# Patient Record
Sex: MALE | Race: OTHER | NOT HISPANIC OR LATINO
[De-identification: names, ages, dates, MRNs, and addresses within clinical notes are randomized per-mention and may not be internally consistent; named-entity substitution may affect disease eponyms.]

---

## 2017-07-28 ENCOUNTER — APPOINTMENT (OUTPATIENT)
Dept: INTERNAL MEDICINE | Facility: CLINIC | Age: 47
End: 2017-07-28

## 2017-08-10 ENCOUNTER — OUTPATIENT (OUTPATIENT)
Dept: OUTPATIENT SERVICES | Facility: HOSPITAL | Age: 47
LOS: 1 days | End: 2017-08-10
Payer: SELF-PAY

## 2017-08-10 ENCOUNTER — APPOINTMENT (OUTPATIENT)
Dept: INTERNAL MEDICINE | Facility: CLINIC | Age: 47
End: 2017-08-10

## 2017-08-10 VITALS
HEIGHT: 67 IN | OXYGEN SATURATION: 96 % | DIASTOLIC BLOOD PRESSURE: 70 MMHG | BODY MASS INDEX: 31.71 KG/M2 | WEIGHT: 202 LBS | HEART RATE: 87 BPM | SYSTOLIC BLOOD PRESSURE: 119 MMHG

## 2017-08-10 DIAGNOSIS — I10 ESSENTIAL (PRIMARY) HYPERTENSION: ICD-10-CM

## 2017-08-10 DIAGNOSIS — R10.32 LEFT LOWER QUADRANT PAIN: ICD-10-CM

## 2017-08-10 DIAGNOSIS — E78.5 HYPERLIPIDEMIA, UNSPECIFIED: ICD-10-CM

## 2017-08-10 DIAGNOSIS — Z00.00 ENCOUNTER FOR GENERAL ADULT MEDICAL EXAMINATION W/OUT ABNORMAL FINDINGS: ICD-10-CM

## 2017-08-10 PROCEDURE — G0463: CPT

## 2017-08-10 PROCEDURE — 90715 TDAP VACCINE 7 YRS/> IM: CPT

## 2017-08-10 PROCEDURE — 90471 IMMUNIZATION ADMIN: CPT

## 2017-08-14 DIAGNOSIS — E78.5 HYPERLIPIDEMIA, UNSPECIFIED: ICD-10-CM

## 2017-08-14 DIAGNOSIS — R10.32 LEFT LOWER QUADRANT PAIN: ICD-10-CM

## 2018-04-21 ENCOUNTER — INPATIENT (INPATIENT)
Facility: HOSPITAL | Age: 48
LOS: 4 days | Discharge: HOME | DRG: 471 | End: 2018-04-26
Attending: NEUROLOGICAL SURGERY | Admitting: PSYCHIATRY & NEUROLOGY
Payer: MEDICAID

## 2018-04-21 VITALS
WEIGHT: 199.96 LBS | TEMPERATURE: 98 F | DIASTOLIC BLOOD PRESSURE: 96 MMHG | HEART RATE: 102 BPM | HEIGHT: 71 IN | RESPIRATION RATE: 17 BRPM | OXYGEN SATURATION: 99 % | SYSTOLIC BLOOD PRESSURE: 164 MMHG

## 2018-04-21 DIAGNOSIS — R27.0 ATAXIA, UNSPECIFIED: ICD-10-CM

## 2018-04-21 LAB
ALBUMIN SERPL ELPH-MCNC: 4.4 G/DL — SIGNIFICANT CHANGE UP (ref 3.3–5)
ALP SERPL-CCNC: 71 U/L — SIGNIFICANT CHANGE UP (ref 40–120)
ALT FLD-CCNC: 28 U/L — SIGNIFICANT CHANGE UP (ref 10–45)
ANION GAP SERPL CALC-SCNC: 12 MMOL/L — SIGNIFICANT CHANGE UP (ref 5–17)
APPEARANCE UR: CLEAR — SIGNIFICANT CHANGE UP
APTT BLD: 28 SEC — SIGNIFICANT CHANGE UP (ref 27.5–37.4)
AST SERPL-CCNC: 23 U/L — SIGNIFICANT CHANGE UP (ref 10–40)
BACTERIA # UR AUTO: ABNORMAL /HPF
BASOPHILS # BLD AUTO: 0.1 K/UL — SIGNIFICANT CHANGE UP (ref 0–0.2)
BASOPHILS NFR BLD AUTO: 1.5 % — SIGNIFICANT CHANGE UP (ref 0–2)
BILIRUB SERPL-MCNC: 0.2 MG/DL — SIGNIFICANT CHANGE UP (ref 0.2–1.2)
BILIRUB UR-MCNC: NEGATIVE — SIGNIFICANT CHANGE UP
BUN SERPL-MCNC: 19 MG/DL — SIGNIFICANT CHANGE UP (ref 7–23)
CALCIUM SERPL-MCNC: 9.5 MG/DL — SIGNIFICANT CHANGE UP (ref 8.4–10.5)
CHLORIDE SERPL-SCNC: 102 MMOL/L — SIGNIFICANT CHANGE UP (ref 96–108)
CO2 SERPL-SCNC: 25 MMOL/L — SIGNIFICANT CHANGE UP (ref 22–31)
COLOR SPEC: SIGNIFICANT CHANGE UP
CREAT SERPL-MCNC: 1.13 MG/DL — SIGNIFICANT CHANGE UP (ref 0.5–1.3)
DIFF PNL FLD: NEGATIVE — SIGNIFICANT CHANGE UP
EOSINOPHIL # BLD AUTO: 0.3 K/UL — SIGNIFICANT CHANGE UP (ref 0–0.5)
EOSINOPHIL NFR BLD AUTO: 2.9 % — SIGNIFICANT CHANGE UP (ref 0–6)
GLUCOSE SERPL-MCNC: 107 MG/DL — HIGH (ref 70–99)
GLUCOSE UR QL: NEGATIVE — SIGNIFICANT CHANGE UP
HCT VFR BLD CALC: 45.1 % — SIGNIFICANT CHANGE UP (ref 39–50)
HGB BLD-MCNC: 14.8 G/DL — SIGNIFICANT CHANGE UP (ref 13–17)
HIV 1 & 2 AB SERPL IA.RAPID: SIGNIFICANT CHANGE UP
INR BLD: 0.99 RATIO — SIGNIFICANT CHANGE UP (ref 0.88–1.16)
KETONES UR-MCNC: NEGATIVE — SIGNIFICANT CHANGE UP
LEUKOCYTE ESTERASE UR-ACNC: NEGATIVE — SIGNIFICANT CHANGE UP
LYMPHOCYTES # BLD AUTO: 2.7 K/UL — SIGNIFICANT CHANGE UP (ref 1–3.3)
LYMPHOCYTES # BLD AUTO: 31.2 % — SIGNIFICANT CHANGE UP (ref 13–44)
MCHC RBC-ENTMCNC: 26 PG — LOW (ref 27–34)
MCHC RBC-ENTMCNC: 32.8 GM/DL — SIGNIFICANT CHANGE UP (ref 32–36)
MCV RBC AUTO: 79.1 FL — LOW (ref 80–100)
MONOCYTES # BLD AUTO: 0.7 K/UL — SIGNIFICANT CHANGE UP (ref 0–0.9)
MONOCYTES NFR BLD AUTO: 8.5 % — SIGNIFICANT CHANGE UP (ref 2–14)
NEUTROPHILS # BLD AUTO: 4.9 K/UL — SIGNIFICANT CHANGE UP (ref 1.8–7.4)
NEUTROPHILS NFR BLD AUTO: 56 % — SIGNIFICANT CHANGE UP (ref 43–77)
NITRITE UR-MCNC: NEGATIVE — SIGNIFICANT CHANGE UP
PH UR: 6 — SIGNIFICANT CHANGE UP (ref 5–8)
PLATELET # BLD AUTO: 318 K/UL — SIGNIFICANT CHANGE UP (ref 150–400)
POTASSIUM SERPL-MCNC: 4 MMOL/L — SIGNIFICANT CHANGE UP (ref 3.5–5.3)
POTASSIUM SERPL-SCNC: 4 MMOL/L — SIGNIFICANT CHANGE UP (ref 3.5–5.3)
PROT SERPL-MCNC: 7.7 G/DL — SIGNIFICANT CHANGE UP (ref 6–8.3)
PROT UR-MCNC: NEGATIVE — SIGNIFICANT CHANGE UP
PROTHROM AB SERPL-ACNC: 10.7 SEC — SIGNIFICANT CHANGE UP (ref 9.8–12.7)
RBC # BLD: 5.7 M/UL — SIGNIFICANT CHANGE UP (ref 4.2–5.8)
RBC # FLD: 12.2 % — SIGNIFICANT CHANGE UP (ref 10.3–14.5)
RBC CASTS # UR COMP ASSIST: SIGNIFICANT CHANGE UP /HPF (ref 0–2)
SODIUM SERPL-SCNC: 139 MMOL/L — SIGNIFICANT CHANGE UP (ref 135–145)
SP GR SPEC: 1.01 — SIGNIFICANT CHANGE UP (ref 1.01–1.02)
UROBILINOGEN FLD QL: NEGATIVE — SIGNIFICANT CHANGE UP
WBC # BLD: 8.7 K/UL — SIGNIFICANT CHANGE UP (ref 3.8–10.5)
WBC # FLD AUTO: 8.7 K/UL — SIGNIFICANT CHANGE UP (ref 3.8–10.5)
WBC UR QL: SIGNIFICANT CHANGE UP /HPF (ref 0–5)

## 2018-04-21 PROCEDURE — 70450 CT HEAD/BRAIN W/O DYE: CPT | Mod: 26

## 2018-04-21 PROCEDURE — 72158 MRI LUMBAR SPINE W/O & W/DYE: CPT | Mod: 26

## 2018-04-21 PROCEDURE — 99285 EMERGENCY DEPT VISIT HI MDM: CPT

## 2018-04-21 PROCEDURE — 70553 MRI BRAIN STEM W/O & W/DYE: CPT | Mod: 26

## 2018-04-21 PROCEDURE — 72157 MRI CHEST SPINE W/O & W/DYE: CPT | Mod: 26

## 2018-04-21 PROCEDURE — 71046 X-RAY EXAM CHEST 2 VIEWS: CPT | Mod: 26

## 2018-04-21 RX ORDER — ACETAMINOPHEN 500 MG
905 TABLET ORAL EVERY 4 HOURS
Qty: 0 | Refills: 0 | Status: DISCONTINUED | OUTPATIENT
Start: 2018-04-21 | End: 2018-04-25

## 2018-04-21 RX ORDER — ENOXAPARIN SODIUM 100 MG/ML
40 INJECTION SUBCUTANEOUS EVERY 24 HOURS
Qty: 0 | Refills: 0 | Status: DISCONTINUED | OUTPATIENT
Start: 2018-04-21 | End: 2018-04-22

## 2018-04-21 RX ORDER — SODIUM CHLORIDE 9 MG/ML
1000 INJECTION INTRAMUSCULAR; INTRAVENOUS; SUBCUTANEOUS ONCE
Qty: 0 | Refills: 0 | Status: COMPLETED | OUTPATIENT
Start: 2018-04-21 | End: 2018-04-21

## 2018-04-21 RX ORDER — DOCUSATE SODIUM 100 MG
100 CAPSULE ORAL THREE TIMES A DAY
Qty: 0 | Refills: 0 | Status: DISCONTINUED | OUTPATIENT
Start: 2018-04-21 | End: 2018-04-25

## 2018-04-21 RX ORDER — ACETAMINOPHEN 500 MG
905 TABLET ORAL EVERY 6 HOURS
Qty: 0 | Refills: 0 | Status: DISCONTINUED | OUTPATIENT
Start: 2018-04-21 | End: 2018-04-25

## 2018-04-21 RX ADMIN — SODIUM CHLORIDE 1000 MILLILITER(S): 9 INJECTION INTRAMUSCULAR; INTRAVENOUS; SUBCUTANEOUS at 15:00

## 2018-04-21 NOTE — ED ADULT NURSE REASSESSMENT NOTE - NS ED NURSE REASSESS COMMENT FT1
Report received from MARTIN HAQUE in [employer].  Patient is admitted and pending bed assignment upstairs.

## 2018-04-21 NOTE — ED PROVIDER NOTE - MEDICAL DECISION MAKING DETAILS
Nemes - 46yo M w no PMHx in the ER for unsteady gait and back pains. Intermittent neck/ back stiffness/ and pain for the past 2weeks mainly mid/upper thoracic area, radiating around both sides of the chest. Unsteady gait developed gradually in the past few days. 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 No other neuro stx, no incontinence, no saddle anesthesia. Neuro intact on exam, no motor/sensory deficits. Hyperactive reflexes to lower xtremoties, unsteady, wide-based gait. Will get CT head, labs, neuro consult, likely admit for further w/u and MRIs

## 2018-04-21 NOTE — H&P ADULT - CRANIAL NERVE
EMOI no nystagmus, no diplopia, PERRLA, V1-3 intact b/l, facial symmetry b/l, hearing grossly intact, tongue midline, VFF, SCMs/traps intact b/l

## 2018-04-21 NOTE — ED ADULT NURSE NOTE - CHPI ED SYMPTOMS NEG
no confusion/no vomiting/no fever/no change in level of consciousness/no dizziness/no nausea/no loss of consciousness/no blurred vision

## 2018-04-21 NOTE — ED PROVIDER NOTE - PHYSICAL EXAMINATION
NAD, VSS (repeated HR- 86), Afebrile, No facial or scalp tender, No spinal tender on palpation, + Mild tender to right cervical trapezium tender without swelling or lesion, Sensory intact with 5/5 strength of all extremities, + Ataxia with unsteady gait.

## 2018-04-21 NOTE — H&P ADULT - ASSESSMENT
48 yo right-handed man who presents to [facility] w/ 2 day onset of gait ataxia (no heel to toe when walking). This was preceded by 2 week onset of neck and lower back stiffness x 2 weeks one day after carrying heavy objects up a stair case. Patient is a  by profession and has no pertinent PMH/PSH surgery. ROS also revealed intermittent tingling pain into the left axilla when neck flexion, otherwise unremarkable for headache, fever, chills, acute vision changes, trauma, LOC, chest pain, SOB, abdominal pain, N/V, loss of bowel/bladder, focal weakness, numbness.

## 2018-04-21 NOTE — H&P ADULT - NSHPLABSRESULTS_GEN_ALL_CORE
04-21    139  |  102  |  19  ----------------------------<  107<H>  4.0   |  25  |  1.13    Ca    9.5      21 Apr 2018 15:05    TPro  7.7  /  Alb  4.4  /  TBili  0.2  /  DBili  x   /  AST  23  /  ALT  28  /  AlkPhos  71  04-21    LIVER FUNCTIONS - ( 21 Apr 2018 15:05 )  Alb: 4.4 g/dL / Pro: 7.7 g/dL / ALK PHOS: 71 U/L / ALT: 28 U/L / AST: 23 U/L / GGT: x           CBC Full  -  ( 21 Apr 2018 15:05 )  WBC Count : 8.7 K/uL  Hemoglobin : 14.8 g/dL  Hematocrit : 45.1 %  Platelet Count - Automated : 318 K/uL  Mean Cell Volume : 79.1 fl  Mean Cell Hemoglobin : 26.0 pg  Mean Cell Hemoglobin Concentration : 32.8 gm/dL  Auto Neutrophil # : 4.9 K/uL  Auto Lymphocyte # : 2.7 K/uL  Auto Monocyte # : 0.7 K/uL  Auto Eosinophil # : 0.3 K/uL  Auto Basophil # : 0.1 K/uL  Auto Neutrophil % : 56.0 %  Auto Lymphocyte % : 31.2 %  Auto Monocyte % : 8.5 %  Auto Eosinophil % : 2.9 %  Auto Basophil % : 1.5 %      Radiology

## 2018-04-21 NOTE — H&P ADULT - NEUROLOGICAL COMMENTS
see HPI Brisk 3+ reflexes b/l achilles, b/l patellar. Normal 2+ reflexes abdominal/biceps/BR/triceps. Absent Babinski/Mcknight's. No dysmetria FTN/HTS. Fine finger movements intact. Brisk 3+ reflexes b/l achilles, b/l patellar. Normal 2+ reflexes abdominal/biceps/BR/triceps. Absent Babinski/Mcknight's. No dysmetria FTN/HTS. Fine finger movements intact. + sustained ankle jerk clonus (L > R)

## 2018-04-21 NOTE — H&P ADULT - PROBLEM SELECTOR PLAN 1
w/ b/l LE hyperreflexia (patellar/achilles; although absent Babinski), otherwise normal exam. Concern for possible lateral corticospinal tract lesion (possibly thoracic/lumbar region; upper UE neuro exam normal). Ddx: cord compression (trauma, tumor, etc.) vs. demyelinating (less likely).   Plan:   -MRI T/L spine w/ and w/o contrast (w/ spinal cord infarct parameters r/o (although unlikely))  -MRI brain w/ and w/o contrast   -Labs: basic labs (CBC w/ diff, CMP, U/a, CRP/ESR)   -PT/OT  -course of treatment pending imaging.

## 2018-04-21 NOTE — ED ADULT TRIAGE NOTE - CHIEF COMPLAINT QUOTE
back pain x 2 weeks but now worsening no trauma or injury and "feels like shocks when he lifts both arms."

## 2018-04-21 NOTE — H&P ADULT - HISTORY OF PRESENT ILLNESS
46 yo right-handed man who presents to [facility] w/ 2 day onset of gait ataxia (no heel to toe when walking). This was preceded by 2 week onset of neck and lower back stiffness x 2 weeks one day after carrying heavy objects up a stair case. Patient is a  by profession and has no pertinent PMH/PSH surgery. ROS also revealed intermittent tingling pain into the left axilla when neck flexion, otherwise unremarkable for headache, fever, chills, acute vision changes, trauma, LOC, chest pain, SOB, abdominal pain, N/V, loss of bowel/bladder, focal weakness, numbness.

## 2018-04-21 NOTE — ED PROVIDER NOTE - OBJECTIVE STATEMENT
46yo male pt, no PMHx c/o unsteady gait. Pt stated he had intermittent neck/ back stiffness/ pain since 2weeks ago and he noticed unsteady gait gradually. Denies injury. Pt described pain as stiffness of all spine with intermittent radiating shock pain to all extremities.  He also feels weakness of B/L LE with unsteady gait. Denies fever, chills or recent sickness. Denies headache, dizziness or visual changes. Denies CP/SOB/ABD pain or N/V/D. Denies sensory changes to extremities. Denies urinary or bowel problems.

## 2018-04-22 LAB — APTT BLD: 32.1 SEC — SIGNIFICANT CHANGE UP (ref 27.5–37.4)

## 2018-04-22 PROCEDURE — 72156 MRI NECK SPINE W/O & W/DYE: CPT | Mod: 26

## 2018-04-22 PROCEDURE — 72125 CT NECK SPINE W/O DYE: CPT | Mod: 26

## 2018-04-22 RX ORDER — DEXAMETHASONE 0.5 MG/5ML
10 ELIXIR ORAL EVERY 6 HOURS
Qty: 0 | Refills: 0 | Status: DISCONTINUED | OUTPATIENT
Start: 2018-04-22 | End: 2018-04-22

## 2018-04-22 RX ORDER — DEXAMETHASONE 0.5 MG/5ML
10 ELIXIR ORAL ONCE
Qty: 0 | Refills: 0 | Status: COMPLETED | OUTPATIENT
Start: 2018-04-22 | End: 2018-04-22

## 2018-04-22 RX ORDER — SODIUM CHLORIDE 9 MG/ML
1000 INJECTION, SOLUTION INTRAVENOUS
Qty: 0 | Refills: 0 | Status: DISCONTINUED | OUTPATIENT
Start: 2018-04-23 | End: 2018-04-25

## 2018-04-22 RX ORDER — DEXAMETHASONE 0.5 MG/5ML
10 ELIXIR ORAL EVERY 6 HOURS
Qty: 0 | Refills: 0 | Status: DISCONTINUED | OUTPATIENT
Start: 2018-04-22 | End: 2018-04-25

## 2018-04-22 RX ADMIN — Medication 102 MILLIGRAM(S): at 12:52

## 2018-04-22 RX ADMIN — Medication 102 MILLIGRAM(S): at 23:31

## 2018-04-22 RX ADMIN — ENOXAPARIN SODIUM 40 MILLIGRAM(S): 100 INJECTION SUBCUTANEOUS at 05:47

## 2018-04-22 RX ADMIN — Medication 102 MILLIGRAM(S): at 17:20

## 2018-04-22 NOTE — CONSULT NOTE ADULT - SUBJECTIVE AND OBJECTIVE BOX
The patient was seen and examined today. He complains of Left arm and leg weakness > than right arm and leg weakness, rapidly progressive over the past 2 weeks. MRI confirms C4-5 HNP with cord compression and edema requiring surgical decompression and stabilization. He has no medical comorbidities. Exam, lab and EKG are stable. The patient is medically stable, medically optimized and has no medical contraindication to surgery tomorrow as required. Exam time 70 minutes including > than 50 % for bedside discussion and counseling. Comprehensive consultation dictated #[id number]

## 2018-04-22 NOTE — CONSULT NOTE ADULT - SUBJECTIVE AND OBJECTIVE BOX
CONSULTING SERVICE:  Internal Medicine.    HISTORY OF PRESENT ILLNESS:  This is the first [facility] admission for this 47-year-old male who presents with a 2-week history of neck pain.  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  Of late, he notes that he had a rock fall on the back of his neck while at work.  He last worked 3 days ago, but has diminishing capacity.  The patient notes that approximately 4 days ago he started developing weakness in arms and legs.  He was having difficulty walking.  He was having difficulty holding objects in his left hand.  He has also developed numbness and tingling in his legs and left arm.  He has constant discomfort in his left arm around the clock and rapidly progressive weakness in his left arm.  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  The patient has been advised surgical decompression, stabilization procedure.  In addition on MRI, he has disease extending into C3-C4 and also below at C5-C6 but not of the extent of the C4-C5 level.    MEDICATIONS:  The patient's present medication includes dexamethasone 10 mg every 6 hours.  He also takes Tylenol for pain relief.    ALLERGIES:  HE HAS NO KNOWN ALLERGIES.    PAST SURGICAL HISTORY:  He has had no previous surgical history.    PAST MEDICAL HISTORY:  He has had no previous medical history.    FAMILY HISTORY:  Essentially normal.    DIET:  The patient's diet is regular and his weight is 200 pounds.        SOCIAL HISTORY:  He is a nonsmoker, nondrinker with no drug history.  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  He lives with his wife and has two older children.    REVIEW OF SYSTEMS:  He states that he hasmoderate headaches that originate from the cervical spine radiating up into the occiput.  He has no changes in hearing or vision.  He has no sinusitis or dental disease.  He has no difficulty swallowing.  He has no shortness of breath, cough, congestion or wheezing.  He has no chest pain, palpitations or arrhythmias.  He has no abdominal pain, change in bowel habits or GI bleeding.  He has no dysuria, frequency or incontinence.  He has no rashes or skin disease.  He has no diabetes or thyroid disease.  He has had no prior neurological complaints.    PHYSICAL EXAMINATION:  At this time shows, VITAL SIGNS:  Blood pressure of 130/90, pulse is 100, respirations are 19.  He is afebrile.  Head:  Examination is normal.  Neck:  He has decreased range of motion with rigidity and tenderness in the mid cervical spine.  CHEST:  He has good breath sounds bilaterally.  CARDIAC:  Examination without gallops or murmurs.  ABDOMEN:  Soft without masses, tenderness, guarding or rebound.  EXTREMITIES:  Without clubbing, cyanosis or edema.  NEUROLOGICAL:  Examination shows 3/5 strength in the left arm, 4/5 strength in the right arm, 2/5 strength in the left leg and 4/5 strength in the right leg.  He has moderate to significant weakness as described.  Sensation is intact to touch and position.    LABORATORY DATA:  Laboratories obtained shows a hemoglobin of 14.8, hematocrit of 45.1, white count of 8.7, platelet count of 318,000.  INR is 0.99.  PTT is 28.0.  Sodium 139, potassium 4.0, chloride is 102, CO2 is 25, BUN is 19, creatinine is 1.13.  Liver enzymes are normal.  HIV was nonreactive.    RADIOLOGY DATA:  CT of the brain was within normal limits.  CT and MRI of spine as prescribed, has a significant lesion at the C4-C5 level with central disk herniation and extruded disk material extending superiorly beyond the C4 vertebral body.  There is compression of the spinal cord with edema, secondary to cord compression.  There is degenerative disease of the levels above and below this at C3-C4, C4-C5, and C5-C6.  The patient's EKG is within normal limits.    IMPRESSION AND PLAN:  The impression is that the patient's is medically stable and has no medical contraindications to cervical decompression of the herniated disk and spinal cord compression.  The examination time was 70 minutes of which greater than 50% was necessary for bedside discussion and counseling with the patient.  He will continue to have medical management postoperatively to lessen the risk of postoperative complications.        _______________________    DICT:	ADRIANA TYSON MD  ([id number]) 04/22/2018 07:56 PM  TRANS:	[id number] 04/22/2018 08:03 PM  JOB:	[id number]    Electronically Signed by:  ADRIANA TYSON 04/23/2018 06:57:56 PM

## 2018-04-22 NOTE — CHART NOTE - NSCHARTNOTEFT_GEN_A_CORE
MRI c-spine concerning for cord compression  Decadron 10 given  Ortho spine consulted, will se the patient

## 2018-04-22 NOTE — CONSULT NOTE ADULT - SUBJECTIVE AND OBJECTIVE BOX
47 year old male presented to [facility] with neck/lower back pain and difficulty ambulating for 4 days. 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 He attempted to rest, but the pain has continued over the last two weeks. About 4 days ago, patient began to notice difficulty walking and unsteadiness with his gait. He then presented to [facility] for evaluation. MRI showed cervical cord compression secondary to disc extrusion. No bowel/bladder symptoms. No fevers/chills.    PAST MEDICAL & SURGICAL HISTORY:  No pertinent past medical history  No significant past surgical history    Vital Signs Last 24 Hrs  T(C): 37.1 (22 Apr 2018 15:55), Max: 37.1 (22 Apr 2018 15:55)  T(F): 98.7 (22 Apr 2018 15:55), Max: 98.7 (22 Apr 2018 15:55)  HR: 101 (22 Apr 2018 15:55) (68 - 101)  BP: 133/92 (22 Apr 2018 15:55) (115/72 - 159/87)  BP(mean): --  RR: 19 (22 Apr 2018 15:55) (16 - 20)  SpO2: 97% (22 Apr 2018 15:55) (96% - 99%)  MRI:   Multilevel degenerative changes with a disc extrusion at C4-5 contributing   to compression of the spinal cord. Elevated signal intensity within the   spinal cord at this level is consistent with edema and/or gliosis from cord   compression.   Exam:  Gen: NAD, Positive Mcknight sign bilaterally, decreased coordination  Motor: 5/5 AIN/PIN/Median/Radial/Ulnar nerve distributions, 5/5 EHL/FHL/TA/GSC  Sensory: SILT Median/Radial/Ulnar Nerve Distributions  Vascular: 2+ Radial pulse    A/P: 47 year old male with C4-5 cord compression  - Pain control  - OR planning for 4/23/18  - NPO past midnight  - Preoperative labs  - Medicine Clearance

## 2018-04-23 DIAGNOSIS — G95.20 UNSPECIFIED CORD COMPRESSION: ICD-10-CM

## 2018-04-23 DIAGNOSIS — R52 PAIN, UNSPECIFIED: ICD-10-CM

## 2018-04-23 LAB
ANION GAP SERPL CALC-SCNC: 14 MMOL/L — SIGNIFICANT CHANGE UP (ref 5–17)
BLD GP AB SCN SERPL QL: NEGATIVE — SIGNIFICANT CHANGE UP
BUN SERPL-MCNC: 16 MG/DL — SIGNIFICANT CHANGE UP (ref 7–23)
CALCIUM SERPL-MCNC: 9.9 MG/DL — SIGNIFICANT CHANGE UP (ref 8.4–10.5)
CHLORIDE SERPL-SCNC: 100 MMOL/L — SIGNIFICANT CHANGE UP (ref 96–108)
CHOLEST SERPL-MCNC: 244 MG/DL — HIGH (ref 10–199)
CO2 SERPL-SCNC: 24 MMOL/L — SIGNIFICANT CHANGE UP (ref 22–31)
CREAT SERPL-MCNC: 0.99 MG/DL — SIGNIFICANT CHANGE UP (ref 0.5–1.3)
GLUCOSE BLDC GLUCOMTR-MCNC: 140 MG/DL — HIGH (ref 70–99)
GLUCOSE BLDC GLUCOMTR-MCNC: 198 MG/DL — HIGH (ref 70–99)
GLUCOSE SERPL-MCNC: 161 MG/DL — HIGH (ref 70–99)
HBA1C BLD-MCNC: 6 % — HIGH (ref 4–5.6)
HCT VFR BLD CALC: 46.3 % — SIGNIFICANT CHANGE UP (ref 39–50)
HDLC SERPL-MCNC: 73 MG/DL — SIGNIFICANT CHANGE UP (ref 40–125)
HGB BLD-MCNC: 15.5 G/DL — SIGNIFICANT CHANGE UP (ref 13–17)
INR BLD: 1.08 RATIO — SIGNIFICANT CHANGE UP (ref 0.88–1.16)
LIPID PNL WITH DIRECT LDL SERPL: 163 MG/DL — HIGH
MCHC RBC-ENTMCNC: 26.3 PG — LOW (ref 27–34)
MCHC RBC-ENTMCNC: 33.5 GM/DL — SIGNIFICANT CHANGE UP (ref 32–36)
MCV RBC AUTO: 78.4 FL — LOW (ref 80–100)
PLATELET # BLD AUTO: 336 K/UL — SIGNIFICANT CHANGE UP (ref 150–400)
POTASSIUM SERPL-MCNC: 4.1 MMOL/L — SIGNIFICANT CHANGE UP (ref 3.5–5.3)
POTASSIUM SERPL-SCNC: 4.1 MMOL/L — SIGNIFICANT CHANGE UP (ref 3.5–5.3)
PROTHROM AB SERPL-ACNC: 11.8 SEC — SIGNIFICANT CHANGE UP (ref 9.8–12.7)
RBC # BLD: 5.9 M/UL — HIGH (ref 4.2–5.8)
RBC # FLD: 12.3 % — SIGNIFICANT CHANGE UP (ref 10.3–14.5)
RH IG SCN BLD-IMP: POSITIVE — SIGNIFICANT CHANGE UP
RH IG SCN BLD-IMP: POSITIVE — SIGNIFICANT CHANGE UP
SODIUM SERPL-SCNC: 138 MMOL/L — SIGNIFICANT CHANGE UP (ref 135–145)
TOTAL CHOLESTEROL/HDL RATIO MEASUREMENT: 3.3 RATIO — LOW (ref 3.4–9.6)
TRIGL SERPL-MCNC: 41 MG/DL — SIGNIFICANT CHANGE UP (ref 10–149)
TSH SERPL-MCNC: 0.91 UIU/ML — SIGNIFICANT CHANGE UP (ref 0.27–4.2)
VIT B12 SERPL-MCNC: 524 PG/ML — SIGNIFICANT CHANGE UP (ref 232–1245)
WBC # BLD: 12.8 K/UL — HIGH (ref 3.8–10.5)
WBC # FLD AUTO: 12.8 K/UL — HIGH (ref 3.8–10.5)

## 2018-04-23 PROCEDURE — 99232 SBSQ HOSP IP/OBS MODERATE 35: CPT

## 2018-04-23 RX ORDER — PANTOPRAZOLE SODIUM 20 MG/1
40 TABLET, DELAYED RELEASE ORAL
Qty: 0 | Refills: 0 | Status: DISCONTINUED | OUTPATIENT
Start: 2018-04-23 | End: 2018-04-25

## 2018-04-23 RX ORDER — INSULIN LISPRO 100/ML
VIAL (ML) SUBCUTANEOUS AT BEDTIME
Qty: 0 | Refills: 0 | Status: DISCONTINUED | OUTPATIENT
Start: 2018-04-23 | End: 2018-04-25

## 2018-04-23 RX ORDER — DEXTROSE 50 % IN WATER 50 %
25 SYRINGE (ML) INTRAVENOUS ONCE
Qty: 0 | Refills: 0 | Status: DISCONTINUED | OUTPATIENT
Start: 2018-04-23 | End: 2018-04-25

## 2018-04-23 RX ORDER — GLUCAGON INJECTION, SOLUTION 0.5 MG/.1ML
1 INJECTION, SOLUTION SUBCUTANEOUS ONCE
Qty: 0 | Refills: 0 | Status: DISCONTINUED | OUTPATIENT
Start: 2018-04-23 | End: 2018-04-25

## 2018-04-23 RX ADMIN — Medication 102 MILLIGRAM(S): at 12:00

## 2018-04-23 RX ADMIN — SODIUM CHLORIDE 100 MILLILITER(S): 9 INJECTION, SOLUTION INTRAVENOUS at 12:02

## 2018-04-23 RX ADMIN — SODIUM CHLORIDE 100 MILLILITER(S): 9 INJECTION, SOLUTION INTRAVENOUS at 00:01

## 2018-04-23 RX ADMIN — PANTOPRAZOLE SODIUM 40 MILLIGRAM(S): 20 TABLET, DELAYED RELEASE ORAL at 21:23

## 2018-04-23 RX ADMIN — SODIUM CHLORIDE 100 MILLILITER(S): 9 INJECTION, SOLUTION INTRAVENOUS at 23:48

## 2018-04-23 RX ADMIN — Medication 102 MILLIGRAM(S): at 17:47

## 2018-04-23 RX ADMIN — Medication 102 MILLIGRAM(S): at 23:48

## 2018-04-23 RX ADMIN — Medication 102 MILLIGRAM(S): at 05:36

## 2018-04-23 NOTE — PROGRESS NOTE ADULT - SUBJECTIVE AND OBJECTIVE BOX
Pt S/E at bedside, no acute events overnight, pain controlled    Vital Signs Last 24 Hrs  T(C): 36.7 (23 Apr 2018 04:31), Max: 37.3 (22 Apr 2018 20:04)  T(F): 98.1 (23 Apr 2018 04:31), Max: 99.1 (22 Apr 2018 20:04)  HR: 88 (23 Apr 2018 04:31) (82 - 123)  BP: 121/77 (23 Apr 2018 04:31) (121/76 - 133/92)  BP(mean): --  RR: 18 (23 Apr 2018 04:31) (18 - 20)  SpO2: 98% (23 Apr 2018 04:31) (96% - 98%)    Gen: NAD, AAOx3    Spine:  Skin intact  +EHL/FHL/TA/GS  +AIN/PIN/M/R/U/Msc/Ax  SILT L3-S1  SILT C5-T1  +DP/PT Pulses  +Radial Pulse  Compartments soft  No calf TTP B/L

## 2018-04-23 NOTE — CONSULT NOTE ADULT - SUBJECTIVE AND OBJECTIVE BOX
p ([id number])     HPI:  48 yo right-handed man who presents to [facility] w/ 2 day onset of gait ataxia (no heel to toe when walking). This was preceded by 2 week onset of neck and lower back stiffness x 2 weeks one day after carrying heavy objects up a stair case. Patient is a  by profession and has no pertinent PMH/PSH surgery. ROS also revealed intermittent tingling pain into the left axilla when neck flexion, otherwise unremarkable for headache, fever, chills, acute vision changes, trauma, LOC, chest pain, SOB, abdominal pain, N/V, loss of bowel/bladder, focal weakness, numbness. (21 Apr 2018 16:39)    PAST MEDICAL HISTORY   No pertinent past medical history    PAST SURGICAL HISTORY   No significant past surgical history    Seafood (Rash; Angioedema; Short breath; Hives)      MEDICATIONS:  Antibiotics:    Neuro:  acetaminophen    Suspension 905 milliGRAM(s) Oral every 6 hours PRN  acetaminophen    Suspension. 905 milliGRAM(s) Oral every 4 hours PRN    Anticoagulation:    Other:  bisacodyl 5 milliGRAM(s) Oral daily PRN  dexamethasone  IVPB 10 milliGRAM(s) IV Intermittent every 6 hours  dextrose 50% Injectable 25 Gram(s) IV Push once  dextrose 50% Injectable 25 Gram(s) IV Push once  docusate sodium 100 milliGRAM(s) Oral three times a day PRN  glucagon  Injectable 1 milliGRAM(s) IntraMuscular once PRN  insulin lispro (HumaLOG) corrective regimen sliding scale   SubCutaneous at bedtime  lactated ringers. 1000 milliLiter(s) IV Continuous <Continuous>  pantoprazole    Tablet 40 milliGRAM(s) Oral before breakfast      SOCIAL HISTORY:   Occupation:   Marital Status:     FAMILY HISTORY:  No pertinent family history in first degree relatives      REVIEW OF SYSTEMS:  Check here if all are normal other than Neurological []  General:  Eyes:  ENT:  Cardiac:  Respiratory:  GI:  Musculoskeletal:   Skin:  Neurologic:   Psychiatric:     PHYSICAL EXAMINATION:   T(C): 37.1 (04-23-18 @ 15:55), Max: 37.3 (04-22-18 @ 20:04)  HR: 96 (04-23-18 @ 15:55) (78 - 123)  BP: 128/80 (04-23-18 @ 15:55) (121/77 - 131/89)  RR: 17 (04-23-18 @ 15:55) (17 - 18)  SpO2: 96% (04-23-18 @ 15:55) (96% - 98%)  Wt(kg): --Height (cm): 180.34 (04-23 @ 09:15)  Weight (kg): 91 (04-23 @ 09:15)    General Examination:     Neurologic Examination:           PHYSICAL EXAM:    Constitutional: No Acute Distress     Neurological: AOx3, Following Commands    Motor exam:          Upper extremity                         Delt     Bicep     Tricep    HG                                                 R         5/5        5/5        5/5       5/5                                               L          5/5        5/5        5/5       5/5          Lower extremity                        HF         KF        KE       DF         PF                                                  R        5/5        5/5        5/5       5/5         5/5                                               L         5/5        5/5       5/5       5/5          5/5                                                 Sensation: [x] intact to light touch  [] decreased:           LABS:                        15.5   12.8  )-----------( 336      ( 23 Apr 2018 04:41 )             46.3     04-23    138  |  100  |  16  ----------------------------<  161<H>  4.1   |  24  |  0.99    Ca    9.9      23 Apr 2018 04:41      PT/INR - ( 23 Apr 2018 04:41 )   PT: 11.8 sec;   INR: 1.08 ratio         PTT - ( 22 Apr 2018 17:33 )  PTT:32.1 sec      RADIOLOGY & ADDITIONAL STUDIES: p ([id number])     HPI:  46 yo right-handed man who presents to [facility] w/ 2 day onset of gait ataxia (no heel to toe when walking). This was preceded by 2 week onset of neck and lower back stiffness x 2 weeks one day after carrying heavy objects up a stair case. Patient is a  by profession and has no pertinent PMH/PSH surgery. ROS also revealed intermittent tingling pain into the left axilla when neck flexion, otherwise unremarkable for headache, fever, chills, acute vision changes, trauma, LOC, chest pain, SOB, abdominal pain, N/V, loss of bowel/bladder, focal weakness, numbness. (21 Apr 2018 16:39)    PAST MEDICAL HISTORY   No pertinent past medical history    PAST SURGICAL HISTORY   No significant past surgical history    Seafood (Rash; Angioedema; Short breath; Hives)      MEDICATIONS:  Antibiotics:    Neuro:  acetaminophen    Suspension 905 milliGRAM(s) Oral every 6 hours PRN  acetaminophen    Suspension. 905 milliGRAM(s) Oral every 4 hours PRN    Anticoagulation:    Other:  bisacodyl 5 milliGRAM(s) Oral daily PRN  dexamethasone  IVPB 10 milliGRAM(s) IV Intermittent every 6 hours  dextrose 50% Injectable 25 Gram(s) IV Push once  dextrose 50% Injectable 25 Gram(s) IV Push once  docusate sodium 100 milliGRAM(s) Oral three times a day PRN  glucagon  Injectable 1 milliGRAM(s) IntraMuscular once PRN  insulin lispro (HumaLOG) corrective regimen sliding scale   SubCutaneous at bedtime  lactated ringers. 1000 milliLiter(s) IV Continuous <Continuous>  pantoprazole    Tablet 40 milliGRAM(s) Oral before breakfast      SOCIAL HISTORY:   Occupation:   Marital Status:     FAMILY HISTORY:  No pertinent family history in first degree relatives    REVIEW OF SYSTEMS:  Check here if all are normal other than Neurological [x]  General:  Eyes:  ENT:  Cardiac:  Respiratory:  GI:  Musculoskeletal:   Skin:  Neurologic:   Psychiatric:     PHYSICAL EXAMINATION:   T(C): 37.1 (04-23-18 @ 15:55), Max: 37.3 (04-22-18 @ 20:04)  HR: 96 (04-23-18 @ 15:55) (78 - 123)  BP: 128/80 (04-23-18 @ 15:55) (121/77 - 131/89)  RR: 17 (04-23-18 @ 15:55) (17 - 18)  SpO2: 96% (04-23-18 @ 15:55) (96% - 98%)  Wt(kg): --Height (cm): 180.34 (04-23 @ 09:15)  Weight (kg): 91 (04-23 @ 09:15)    General Examination:     Neurologic Examination:           PHYSICAL EXAM:    Constitutional: No Acute Distress     Neurological: AOx3, Following Commands    Motor exam:          Upper extremity                         Delt     Bicep     Tricep    HG                                                 R         5/5        5/5        5/5       5/5                                               L          5/5        5/5        5/5       5/5          Lower extremity                        HF         KF        KE       DF         PF                                                  R        5/5        5/5        5/5       5/5         5/5                                               L         5/5        5/5       5/5       5/5          5/5                                                 Sensation: [x] intact to light touch  [] decreased:     Bilateral baum's sign, bilateral clonus, Reflexes 3+ in lower extremities, 2+ in upper extremities      LABS:                        15.5   12.8  )-----------( 336      ( 23 Apr 2018 04:41 )             46.3     04-23    138  |  100  |  16  ----------------------------<  161<H>  4.1   |  24  |  0.99    Ca    9.9      23 Apr 2018 04:41      PT/INR - ( 23 Apr 2018 04:41 )   PT: 11.8 sec;   INR: 1.08 ratio         PTT - ( 22 Apr 2018 17:33 )  PTT:32.1 sec      RADIOLOGY & ADDITIONAL STUDIES:

## 2018-04-23 NOTE — OCCUPATIONAL THERAPY INITIAL EVALUATION ADULT - ADDITIONAL COMMENTS
CT C Spine 4.22: Cervical spondylosis most pronounced at C5-C6.  MR Spine 4/22: Multilevel degenerative changes with a disc extrusion at C4-5 contributing to compression of the spinal cord. Elevated signal intensity within the spinal cord at this level is consistent with edema and/or gliosis from cord compression.  CT Head (-)

## 2018-04-23 NOTE — PHYSICAL THERAPY INITIAL EVALUATION ADULT - PERTINENT HX OF CURRENT PROBLEM, REHAB EVAL
48 yo right-handed man who presents to [facility] w/ 2 day onset of gait ataxia (no heel to toe when walking). This was preceded by 2 week onset of neck and lower back stiffness x 2 weeks one day after carrying heavy objects up a stair case. Patient is a  by profession and has no pertinent PMH/PSH surgery. ROS also revealed intermittent tingling pain into the left axilla when neck flexion,

## 2018-04-23 NOTE — PROGRESS NOTE ADULT - SUBJECTIVE AND OBJECTIVE BOX
This is the first [facility] admission for this 47-year-old male who presents with a 2-week history of neck pain.  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  Of late, he notes that he had a rock fall on the back of his neck while at work.  He last worked 3 days ago, but has diminishing capacity.  The patient notes that approximately 4 days ago he started developing weakness in arms and legs.  He was having difficulty walking.  He was having difficulty holding objects in his left hand.  He has also developed numbness and tingling in his legs and left arm.  He has constant discomfort in his left arm around the clock and rapidly progressive weakness in his left arm.  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  The patient has been advised surgical decompression, stabilization procedure.  In addition on MRI, he has disease extending into C3-C4 and also below at C5-C6 but not of the extent of the C4-C5 level.      MEDICATIONS  (STANDING):  dexamethasone  IVPB 10 milliGRAM(s) IV Intermittent every 6 hours  dextrose 50% Injectable 25 Gram(s) IV Push once  dextrose 50% Injectable 25 Gram(s) IV Push once  insulin lispro (HumaLOG) corrective regimen sliding scale   SubCutaneous at bedtime  lactated ringers. 1000 milliLiter(s) (100 mL/Hr) IV Continuous <Continuous>  pantoprazole    Tablet 40 milliGRAM(s) Oral before breakfast    MEDICATIONS  (PRN):  acetaminophen    Suspension 905 milliGRAM(s) Oral every 6 hours PRN For Temp greater than 38 C (100.4 F)  acetaminophen    Suspension. 905 milliGRAM(s) Oral every 4 hours PRN Severe Pain (7 - 10)  bisacodyl 5 milliGRAM(s) Oral daily PRN Constipation  docusate sodium 100 milliGRAM(s) Oral three times a day PRN Constipation  glucagon  Injectable 1 milliGRAM(s) IntraMuscular once PRN Glucose LESS THAN 70 milligrams/deciliter          VITALS:   T(C): 37.1 (04-23-18 @ 15:55), Max: 37.3 (04-22-18 @ 20:04)  HR: 96 (04-23-18 @ 15:55) (78 - 123)  BP: 128/80 (04-23-18 @ 15:55) (121/77 - 131/89)  RR: 17 (04-23-18 @ 15:55) (17 - 18)  SpO2: 96% (04-23-18 @ 15:55) (96% - 98%)  Wt(kg): --        PHYSICAL EXAMINATION:  At this time shows, VITAL SIGNS:  Blood pressure of 130/90, pulse is 100, respirations are 19.  He is afebrile.    Head:  Examination is normal.    Neck:  He has decreased range of motion with rigidity and tenderness in the mid cervical spine.    CHEST:  He has good breath sounds bilaterally.    CARDIAC:  Examination without gallops or murmurs.    ABDOMEN:  Soft without masses, tenderness, guarding or rebound.    EXTREMITIES:  Without clubbing, cyanosis or edema.    NEUROLOGICAL:  Examination shows 3/5 strength in the left arm, 4/5 strength in the right arm, 2/5 strength in the left leg and 4/5 strength in the right leg.  He has moderate to significant weakness as described.  Sensation is intact to touch and position.      LABS:        CBC Full  -  ( 23 Apr 2018 04:41 )  WBC Count : 12.8 K/uL  Hemoglobin : 15.5 g/dL  Hematocrit : 46.3 %  Platelet Count - Automated : 336 K/uL  Mean Cell Volume : 78.4 fl  Mean Cell Hemoglobin : 26.3 pg  Mean Cell Hemoglobin Concentration : 33.5 gm/dL  Auto Neutrophil # : x  Auto Lymphocyte # : x  Auto Monocyte # : x  Auto Eosinophil # : x  Auto Basophil # : x  Auto Neutrophil % : x  Auto Lymphocyte % : x  Auto Monocyte % : x  Auto Eosinophil % : x  Auto Basophil % : x    04-23    138  |  100  |  16  ----------------------------<  161<H>  4.1   |  24  |  0.99    Ca    9.9      23 Apr 2018 04:41        PT/INR - ( 23 Apr 2018 04:41 )   PT: 11.8 sec;   INR: 1.08 ratio         PTT - ( 22 Apr 2018 17:33 )  PTT:32.1 sec    CAPILLARY BLOOD GLUCOSE      POCT Blood Glucose.: 140 mg/dL (23 Apr 2018 12:15)      RADIOLOGY & ADDITIONAL TESTS:

## 2018-04-23 NOTE — PHYSICAL THERAPY INITIAL EVALUATION ADULT - ADDITIONAL COMMENTS
MRI confirms C4-5 HNP with cord compression and edema requiring surgical decompression and stabilization, +myelopathy CT C Spine 4.22: Cervical spondylosis most pronounced at C5-C6.  MR Spine 4/22: Multilevel degenerative changes with a disc extrusion at C4-5 contributing to compression of the spinal cord. Elevated signal intensity within the spinal cord at this level is consistent with edema and/or gliosis from cord compression.  CT Head (-) Patient lives with spouse and two children (ages 17 and 21) in a private home in  [facility]. Patient reports being independent with ambulation and daily tasks PTA.

## 2018-04-23 NOTE — PHYSICAL THERAPY INITIAL EVALUATION ADULT - PRECAUTIONS/LIMITATIONS, REHAB EVAL
spinal precautions/surgical precautions/Hosp course continued from above: CT C Spine 4.22: Cervical spondylosis most pronounced at C5-C6. MR Spine 4/22: Multilevel degenerative changes with a disc extrusion at C4-5 contributing to compression of the spinal cord. Elevated signal intensity within the spinal cord at this level is consistent with edema and/or gliosis from cord compression. CT Head (-). Scheduled for and underwent C4-C6 ACDF by Dr. Sandy on 4/25. Recent MD note states to progress mobilty with PT.

## 2018-04-23 NOTE — PROGRESS NOTE ADULT - PROBLEM SELECTOR PLAN 1
decadron 10mg q6 hrs.  Protonix for GI ppx  Pt has no hx of diabetes. Will check blood glucose daily and HISS as needed.   Orthopedics planned for surgery likely today.  Will follow up.  fall precautions  DVT ppx- holding lovenox for today for possible surgery. Intermittent compression stockings in the meantime.

## 2018-04-23 NOTE — PROGRESS NOTE ADULT - SUBJECTIVE AND OBJECTIVE BOX
Neurology Follow up note    Patient is a 47y old  Male who presents with a chief complaint of worsening ataxic gait x 2 days, neck/lower back stiffness x 2 weeks s/p heavy lifting (21 Apr 2018 16:39)      Subjective:Interval History - No events overnight. denies any bladder or bowel issues. still having tingling in the lower extremities    Objective:   Vital Signs Last 24 Hrs  T(C): 36.9 (23 Apr 2018 09:15), Max: 37.3 (22 Apr 2018 20:04)  T(F): 98.4 (23 Apr 2018 09:15), Max: 99.1 (22 Apr 2018 20:04)  HR: 78 (23 Apr 2018 09:15) (78 - 123)  BP: 128/77 (23 Apr 2018 09:15) (121/77 - 133/92)  BP(mean): --  RR: 18 (23 Apr 2018 09:15) (18 - 19)  SpO2: 98% (23 Apr 2018 07:25) (96% - 98%)    General Exam:   General appearance: No acute distress                   Neurological Exam:  Mental Status: Orientated to self, date and place.  Attention intact.  No dysarthria, speech fluent. Follows simple and complex commands.    Cranial Nerves:  PERRL, EOMI, VFF, no nystagmus.  No APD.    CN V1-3 intact to light touch.  No facial asymmetry.  Hearing intact bilaterally.  Tongue midline.  Shoulder shrug intact bilaterally.    Motor: Normal tone. No drift. 4/5 RUE, 4/5 LUE triceps, delt 5/5 bilat, 5/5 bilat wrist ext/flexors, 5/5 finger flex/ext. 5/5 bilat LE throughout.           Coord: No dysmetria on finger-nose-finger     Tremor: No resting, postural or action tremor.  No myoclonus.    Sensation: intact to light touch, pinprick, vibration and proprioception    Deep Tendon Reflexes: triple flexion RUE 3+, LUE 3+, 3+ patellar bilat, 4+ ankle bilat with 3-4 beats clonus    Gait: normal and stable.      Other:    04-23    138  |  100  |  16  ----------------------------<  161<H>  4.1   |  24  |  0.99    Ca    9.9      23 Apr 2018 04:41    TPro  7.7  /  Alb  4.4  /  TBili  0.2  /  DBili  x   /  AST  23  /  ALT  28  /  AlkPhos  71  04-21 04-23    138  |  100  |  16  ----------------------------<  161<H>  4.1   |  24  |  0.99    Ca    9.9      23 Apr 2018 04:41    TPro  7.7  /  Alb  4.4  /  TBili  0.2  /  DBili  x   /  AST  23  /  ALT  28  /  AlkPhos  71  04-21    LIVER FUNCTIONS - ( 21 Apr 2018 15:05 )  Alb: 4.4 g/dL / Pro: 7.7 g/dL / ALK PHOS: 71 U/L / ALT: 28 U/L / AST: 23 U/L / GGT: x                                 15.5   12.8  )-----------( 336      ( 23 Apr 2018 04:41 )             46.3     Radiology          MEDICATIONS  (STANDING):  dexamethasone  IVPB 10 milliGRAM(s) IV Intermittent every 6 hours  lactated ringers. 1000 milliLiter(s) (100 mL/Hr) IV Continuous <Continuous>    MEDICATIONS  (PRN):  acetaminophen    Suspension 905 milliGRAM(s) Oral every 6 hours PRN For Temp greater than 38 C (100.4 F)  acetaminophen    Suspension. 905 milliGRAM(s) Oral every 4 hours PRN Severe Pain (7 - 10)  bisacodyl 5 milliGRAM(s) Oral daily PRN Constipation  docusate sodium 100 milliGRAM(s) Oral three times a day PRN Constipation    < from: MR Cervical Spine w/wo IV Cont (04.22.18 @ 11:23) >  FINDINGS: There is straightening of the normal cervical lordosis. There   is disc desiccation at C2-3 through C6-7.    C2-3: Minimal left uncovertebral joint hypertrophy narrows the left   neural foramen.    See 34: Circumferential disc bulge and posterior osteophyte formation.   Mild bilateral uncovertebral joint hypertrophy. Mild central canal and   mild left neural foraminal stenosis.    C45: Central disc herniation with extruded disc material extending   superiorly behind the C4 vertebral body. Extruded disc material results   in compression of the spinal cord. Increased signal intensity is present   within the spinal cord consistent with gliosis and/or edema from spinal   cord compression. The neural foramina are patent.    See 56: Anterior and posterior osteophytes, disc space narrowing and a   posterior osteophytic ridging. Uncovertebral joint hypertrophy   bilaterally. Moderate central canal, severe right and moderate left   neural foraminal stenosis with flattening of the spinal cord.    C6-7: Minimal disc bulge. Neurology Follow up note    Patient is a 47y old  Male who presents with a chief complaint of worsening ataxic gait x 2 days, neck/lower back stiffness x 2 weeks s/p heavy lifting (21 Apr 2018 16:39)    Subjective:Interval History - No events overnight. denies any bladder or bowel issues. still having tingling in the lower extremities.  mild improvement in strength reported    Objective:   Vital Signs Last 24 Hrs  T(C): 36.9 (23 Apr 2018 09:15), Max: 37.3 (22 Apr 2018 20:04)  T(F): 98.4 (23 Apr 2018 09:15), Max: 99.1 (22 Apr 2018 20:04)  HR: 78 (23 Apr 2018 09:15) (78 - 123)  BP: 128/77 (23 Apr 2018 09:15) (121/77 - 133/92)  RR: 18 (23 Apr 2018 09:15) (18 - 19)  SpO2: 98% (23 Apr 2018 07:25) (96% - 98%)    General Exam:   General appearance: No acute distress                   Neurological Exam:  Mental Status: Orientated to self, date and place.  Attention intact.  No dysarthria, speech fluent. Follows simple and complex commands.    Cranial Nerves:  PERRL, EOMI, VFF, no nystagmus.  No APD.    CN V1-3 intact to light touch.  No facial asymmetry.  Hearing intact bilaterally.  Tongue midline.  Shoulder shrug intact bilaterally.    Motor: Normal tone. No drift. 4/5 RUE, 4/5 LUE triceps, delt 5/5 bilat, 5/5 bilat wrist ext/flexors, 5/5 finger flex/ext. 5/5 bilat HF 4+, o/w LE throughout.           Coord: No dysmetria on finger-nose-finger     Tremor: No resting, postural or action tremor.  No myoclonus.    Sensation: intact to light touch, pinprick, vibration and proprioception    Deep Tendon Reflexes: triple flexion RUE 3+, LUE 3+ (bi, tri, brach, pat, FF), 3+ patellar bilat, 4+ ankle bilat with 3-4 beats clonus    Gait: normal and stable.      Other:    04-23    138  |  100  |  16  ----------------------------<  161<H>  4.1   |  24  |  0.99    Ca    9.9      23 Apr 2018 04:41    TPro  7.7  /  Alb  4.4  /  TBili  0.2  /  DBili  x   /  AST  23  /  ALT  28  /  AlkPhos  71  04-21 04-23    138  |  100  |  16  ----------------------------<  161<H>  4.1   |  24  |  0.99    Ca    9.9      23 Apr 2018 04:41    TPro  7.7  /  Alb  4.4  /  TBili  0.2  /  DBili  x   /  AST  23  /  ALT  28  /  AlkPhos  71  04-21    LIVER FUNCTIONS - ( 21 Apr 2018 15:05 )  Alb: 4.4 g/dL / Pro: 7.7 g/dL / ALK PHOS: 71 U/L / ALT: 28 U/L / AST: 23 U/L / GGT: x                                 15.5   12.8  )-----------( 336      ( 23 Apr 2018 04:41 )             46.3     Radiology          MEDICATIONS  (STANDING):  dexamethasone  IVPB 10 milliGRAM(s) IV Intermittent every 6 hours  lactated ringers. 1000 milliLiter(s) (100 mL/Hr) IV Continuous <Continuous>    MEDICATIONS  (PRN):  acetaminophen    Suspension 905 milliGRAM(s) Oral every 6 hours PRN For Temp greater than 38 C (100.4 F)  acetaminophen    Suspension. 905 milliGRAM(s) Oral every 4 hours PRN Severe Pain (7 - 10)  bisacodyl 5 milliGRAM(s) Oral daily PRN Constipation  docusate sodium 100 milliGRAM(s) Oral three times a day PRN Constipation    < from: MR Cervical Spine w/wo IV Cont (04.22.18 @ 11:23) >  FINDINGS: There is straightening of the normal cervical lordosis. There   is disc desiccation at C2-3 through C6-7.    C2-3: Minimal left uncovertebral joint hypertrophy narrows the left   neural foramen.    See 34: Circumferential disc bulge and posterior osteophyte formation.   Mild bilateral uncovertebral joint hypertrophy. Mild central canal and   mild left neural foraminal stenosis.    C45: Central disc herniation with extruded disc material extending   superiorly behind the C4 vertebral body. Extruded disc material results   in compression of the spinal cord. Increased signal intensity is present   within the spinal cord consistent with gliosis and/or edema from spinal   cord compression. The neural foramina are patent.    See 56: Anterior and posterior osteophytes, disc space narrowing and a   posterior osteophytic ridging. Uncovertebral joint hypertrophy   bilaterally. Moderate central canal, severe right and moderate left   neural foraminal stenosis with flattening of the spinal cord.    C6-7: Minimal disc bulge.

## 2018-04-23 NOTE — CONSULT NOTE ADULT - ASSESSMENT
47M here with ataxic gait found to have multilevel cervical spinal disc herniation worst a C4/5 with cord compression and cord signal change  - Plan for C4-6 ACDF with Dr. Sandy tomorrow   - Preop for OR tomorrow: NPO after midnight, repeat Type and screen, medical optimization noted in chart, IVF, hold chemoppx for DVT  - Q4hr neurochecks  - Pain control 47M here with ataxic gait found to have multilevel cervical spinal disc herniation worst a C4/5 with cord compression and cord signal change    - Plan for C4-6 ACDF with Dr. Sandy tomorrow   - Preop for OR tomorrow: NPO after midnight, repeat Type and screen, medical optimization noted in chart, IVF, hold chemoppx for DVT  - Q4hr neurochecks  - Pain control  - Discussed with patient risks, benefits, alternatives to surgery. All questions answered. He is agreeable to proceed.

## 2018-04-23 NOTE — PHYSICAL THERAPY INITIAL EVALUATION ADULT - PLANNED THERAPY INTERVENTIONS, PT EVAL
Stair Training: GOAL: Pt will be able to negotiate up/down 13 steps, w/ independence, w/ unilateral rails/and appropriate assistive device, step-to gait pattern, in 2 weeks./balance training/gait training

## 2018-04-23 NOTE — CONSULT NOTE ADULT - ATTENDING COMMENTS
The patient is medically stable, medically optimized and has no medical contraindication to surgery tomorrow as required. Exam time 70 minutes including > than 50 % for bedside discussion and counseling.
I saw and evaluated the patient. I reviewed the resident's note and agree. The patient is a 47-year-old male who presents with neck pain and difficulty walking, found to have C4-C6 herniated discs resulting in spinal cord compression and spinal cord signal change. The patient's neurologic exam is significant for a myelopathic gait and hyperreflexia. The risks, benefits, and alternatives to surgery via a C4-C6 ACDF were discussed with the patient, who expressed understanding, and wishes to proceed with surgery.

## 2018-04-23 NOTE — CONSULT NOTE ADULT - CONSULT REASON
Medical evaluation Pre-op
C3/6 disc herniation
Cervical Myelopathy
Medical Evaluation pre-op
gait ataxia, neck/back stiffness x 2 weeks s/p heavy lifting

## 2018-04-23 NOTE — PROGRESS NOTE ADULT - ASSESSMENT
A/P: 47 year old male with C4-5 cord compression  - Pain control  - Plan for OR today with Dr. Reyes  - NPO except meds  - IVF while NPO  - Hold chem dvt prophylaxis - SCDs  - Medical optimization for surgery

## 2018-04-23 NOTE — OCCUPATIONAL THERAPY INITIAL EVALUATION ADULT - LIVES WITH, PROFILE
children/Pt lives in private home with family, 5 steps to enter with HR, walk in shower in bathroom. Pt I in ADLs and ambulation prior to admission/spouse

## 2018-04-23 NOTE — OCCUPATIONAL THERAPY INITIAL EVALUATION ADULT - PERTINENT HX OF CURRENT PROBLEM, REHAB EVAL
46 yo M who p/w 2 day onset of gait ataxia (no heel to toe when walking). This was preceded by 2 wk onset of neck & lower back stiffness x2 wks one day after carrying heavy objects up a stair case. Pt is a  by profession. ROS also revealed intermittent tingling pain into the L axilla when neck flexion, otherwise unremarkable.

## 2018-04-23 NOTE — PRE-OP CHECKLIST - BMI (KG/M2)
28 Unspecified fracture of right wrist and hand, subsequent encounter for fracture with delayed healing

## 2018-04-23 NOTE — PROGRESS NOTE ADULT - ASSESSMENT
47M here with ataxic gait found to have multilevel cervical spinal disc herniation worst a C4/5 with cord compression and cord signal change

## 2018-04-23 NOTE — PROGRESS NOTE ADULT - ASSESSMENT
46 yo right-handed man who presents to [facility] w/ 2 day onset of gait ataxia (no heel to toe when walking). This was preceded by 2 week onset of neck and lower back stiffness x 2 weeks one day after carrying heavy objects up a stair case. Patient is a  by profession and has no pertinent PMH/PSH surgery. ROS also revealed intermittent tingling pain into the left axilla when neck flexion. MR. Cervical spine revealed C4-C5 cord compression with edema. Exam notable for mild UE weakness and brisk reflexes throughout. 46 yo right-handed man who presents to [facility] w/ 2 day onset of gait ataxia (no heel to toe when walking). This was preceded by 2 week onset of neck and lower back stiffness x 2 weeks one day after carrying heavy objects up a stair case. Patient is a  by profession and has no pertinent PMH/PSH surgery. ROS also revealed intermittent tingling pain into the left axilla when neck flexion. MR. Cervical spine revealed C4-C5 cord compression with edema. Exam notable for mild UE>LE symmetrical weakness and brisk reflexes throughout.

## 2018-04-24 ENCOUNTER — TRANSCRIPTION ENCOUNTER (OUTPATIENT)
Age: 48
End: 2018-04-24

## 2018-04-24 LAB
GLUCOSE BLDC GLUCOMTR-MCNC: 134 MG/DL — HIGH (ref 70–99)
GLUCOSE BLDC GLUCOMTR-MCNC: 144 MG/DL — HIGH (ref 70–99)
GLUCOSE BLDC GLUCOMTR-MCNC: 146 MG/DL — HIGH (ref 70–99)
GLUCOSE BLDC GLUCOMTR-MCNC: 169 MG/DL — HIGH (ref 70–99)

## 2018-04-24 PROCEDURE — 99231 SBSQ HOSP IP/OBS SF/LOW 25: CPT

## 2018-04-24 RX ADMIN — Medication 102 MILLIGRAM(S): at 11:43

## 2018-04-24 RX ADMIN — PANTOPRAZOLE SODIUM 40 MILLIGRAM(S): 20 TABLET, DELAYED RELEASE ORAL at 05:04

## 2018-04-24 RX ADMIN — Medication 102 MILLIGRAM(S): at 05:03

## 2018-04-24 RX ADMIN — Medication 102 MILLIGRAM(S): at 18:36

## 2018-04-24 RX ADMIN — Medication 102 MILLIGRAM(S): at 23:23

## 2018-04-24 NOTE — PROGRESS NOTE ADULT - SUBJECTIVE AND OBJECTIVE BOX
Examined at bedside.    Vital Signs Last 24 Hrs  T(C): 36.4 (24 Apr 2018 04:19), Max: 37.1 (23 Apr 2018 15:55)  T(F): 97.6 (24 Apr 2018 04:19), Max: 98.7 (23 Apr 2018 15:55)  HR: 80 (24 Apr 2018 04:19) (78 - 102)  BP: 124/74 (24 Apr 2018 04:19) (118/75 - 128/80)  BP(mean): --  RR: 18 (24 Apr 2018 04:19) (17 - 18)  SpO2: 98% (24 Apr 2018 04:19) (96% - 98%)    General Examination:     Neurologic Examination:           PHYSICAL EXAM:    Constitutional: No Acute Distress     Neurological: AOx3, Following Commands    Motor exam:          Upper extremity                         Delt     Bicep     Tricep    HG                                                 R         5/5        5/5        5/5       5/5                                               L          5/5        5/5        5/5       5/5          Lower extremity                        HF         KF        KE       DF         PF                                                  R        5/5        5/5        5/5       5/5         5/5                                               L         5/5        5/5       5/5       5/5          5/5                                                 Sensation: [x] intact to light touch  [] decreased:     Bilateral baum's sign, bilateral clonus, Reflexes 3+ in lower extremities, 2+ in upper extremities

## 2018-04-24 NOTE — PROGRESS NOTE ADULT - ATTENDING COMMENTS
The patient is medically stable, medically optimized and has no medical contraindication to surgery tomorrow as required. Exam time 70 minutes including > than 50 % for bedside discussion and counseling. The patient is medically stable, medically optimized and has no medical contraindication to surgery tomorrow as required. Exam time 40 minutes including > than 50 % for bedside discussion and counseling.

## 2018-04-24 NOTE — PROGRESS NOTE ADULT - SUBJECTIVE AND OBJECTIVE BOX
Neurology Follow up note    Patient is a 47y old  Male who presents with a chief complaint of worsening ataxic gait x 2 days, neck/lower back stiffness x 2 weeks s/p heavy lifting (21 Apr 2018 16:39)    Subjective:Interval History - No events overnight. denies any bladder or bowel issues. still having tingling hands b/l and LE b/w.  mild improvement in strength reported    Objective:   Vital Signs Last 24 Hrs  T(C): 36.9 (23 Apr 2018 09:15), Max: 37.3 (22 Apr 2018 20:04)  T(F): 98.4 (23 Apr 2018 09:15), Max: 99.1 (22 Apr 2018 20:04)  HR: 78 (23 Apr 2018 09:15) (78 - 123)  BP: 128/77 (23 Apr 2018 09:15) (121/77 - 133/92)  RR: 18 (23 Apr 2018 09:15) (18 - 19)  SpO2: 98% (23 Apr 2018 07:25) (96% - 98%)    General Exam:   General appearance: No acute distress                   Neurological Exam:  Mental Status: Orientated to self, date and place.  Attention intact.  No dysarthria, speech fluent. Follows simple and complex commands.    Cranial Nerves:  PERRL, EOMI, VFF, no nystagmus.  No APD.    CN V1-3 intact to light touch.  No facial asymmetry.  Hearing intact bilaterally.  Tongue midline.  Shoulder shrug intact bilaterally.    Motor: Normal tone. No drift. 4/5 RUE, 4/5 LUE triceps, delt 5/5 bilat, 5/5 bilat wrist ext/flexors, 5/5 finger flex/ext. 5/5 bilat HF 4+, o/w LE throughout.           Coord: No dysmetria on finger-nose-finger     Tremor: No resting, postural or action tremor.  No myoclonus.    Sensation: intact to light touch, pinprick, vibration and proprioception    Deep Tendon Reflexes: triple flexion RUE 3+, LUE 3+ (bi, tri, brach, pat, FF), 3+ patellar bilat, 4+ ankle bilat with 3-4 beats clonus    Gait: normal and stable.      Other:    04-23    138  |  100  |  16  ----------------------------<  161<H>  4.1   |  24  |  0.99    Ca    9.9      23 Apr 2018 04:41    TPro  7.7  /  Alb  4.4  /  TBili  0.2  /  DBili  x   /  AST  23  /  ALT  28  /  AlkPhos  71  04-21 04-23    138  |  100  |  16  ----------------------------<  161<H>  4.1   |  24  |  0.99    Ca    9.9      23 Apr 2018 04:41    TPro  7.7  /  Alb  4.4  /  TBili  0.2  /  DBili  x   /  AST  23  /  ALT  28  /  AlkPhos  71  04-21    LIVER FUNCTIONS - ( 21 Apr 2018 15:05 )  Alb: 4.4 g/dL / Pro: 7.7 g/dL / ALK PHOS: 71 U/L / ALT: 28 U/L / AST: 23 U/L / GGT: x                                 15.5   12.8  )-----------( 336      ( 23 Apr 2018 04:41 )             46.3     Radiology          MEDICATIONS  (STANDING):  dexamethasone  IVPB 10 milliGRAM(s) IV Intermittent every 6 hours  lactated ringers. 1000 milliLiter(s) (100 mL/Hr) IV Continuous <Continuous>    MEDICATIONS  (PRN):  acetaminophen    Suspension 905 milliGRAM(s) Oral every 6 hours PRN For Temp greater than 38 C (100.4 F)  acetaminophen    Suspension. 905 milliGRAM(s) Oral every 4 hours PRN Severe Pain (7 - 10)  bisacodyl 5 milliGRAM(s) Oral daily PRN Constipation  docusate sodium 100 milliGRAM(s) Oral three times a day PRN Constipation    < from: MR Cervical Spine w/wo IV Cont (04.22.18 @ 11:23) >  FINDINGS: There is straightening of the normal cervical lordosis. There   is disc desiccation at C2-3 through C6-7.    C2-3: Minimal left uncovertebral joint hypertrophy narrows the left   neural foramen.    See 34: Circumferential disc bulge and posterior osteophyte formation.   Mild bilateral uncovertebral joint hypertrophy. Mild central canal and   mild left neural foraminal stenosis.    C45: Central disc herniation with extruded disc material extending   superiorly behind the C4 vertebral body. Extruded disc material results   in compression of the spinal cord. Increased signal intensity is present   within the spinal cord consistent with gliosis and/or edema from spinal   cord compression. The neural foramina are patent.    See 56: Anterior and posterior osteophytes, disc space narrowing and a   posterior osteophytic ridging. Uncovertebral joint hypertrophy   bilaterally. Moderate central canal, severe right and moderate left   neural foraminal stenosis with flattening of the spinal cord.    C6-7: Minimal disc bulge. Neurology Follow up note    Patient is a 47y old  Male who presents with a chief complaint of worsening ataxic gait x 2 days, neck/lower back stiffness x 2 weeks s/p heavy lifting (21 Apr 2018 16:39)    Subjective:Interval History - No events overnight. denies any bladder or bowel issues. still having tingling hands b/l and LE b/w.  mild improvement in strength reported    Vital Signs Last 24 Hrs  T(C): 36.4 (24 Apr 2018 07:23), Max: 37.1 (23 Apr 2018 15:55)  T(F): 97.5 (24 Apr 2018 07:23), Max: 98.7 (23 Apr 2018 15:55)  HR: 82 (24 Apr 2018 07:23) (80 - 102)  BP: 126/70 (24 Apr 2018 07:23) (118/75 - 128/80)  BP(mean): --  RR: 18 (24 Apr 2018 07:23) (17 - 18)  SpO2: 94% (24 Apr 2018 07:23) (94% - 98%)    General Exam:   General appearance: No acute distress                   Neurological Exam:  Mental Status: Orientated to self, date and place.  Attention intact.  No dysarthria, speech fluent. Follows simple and complex commands.    Cranial Nerves:  PERRL, EOMI, VFF, no nystagmus.  No APD.    CN V1-3 intact to light touch.  No facial asymmetry.  Hearing intact bilaterally.  Tongue midline.  Shoulder shrug intact bilaterally.    Motor: Normal tone. No drift. 4/5 RUE, 4/5 LUE triceps, delt 5/5 bilat, 5/5 bilat wrist ext/flexors, 5/5 finger flex/ext. 5/5 bilat HF 4+, o/w LE throughout.           Coord: No dysmetria on finger-nose-finger                           15.5   12.8  )-----------( 336      ( 23 Apr 2018 04:41 )             46.3   04-23    138  |  100  |  16  ----------------------------<  161<H>  4.1   |  24  |  0.99    Ca    9.9      23 Apr 2018 04:41      Tremor: No resting, postural or action tremor.  No myoclonus.    Sensation: intact to light touch, pinprick, vibration and proprioception    Deep Tendon Reflexes: triple flexion RUE 3+, LUE 3+ (bi, tri, brach, pat, FF), 3+ patellar bilat, 4+ ankle bilat with 3-4 beats clonus    Gait: normal and stable.      Other:  Radiology          MEDICATIONS  (STANDING):  dexamethasone  IVPB 10 milliGRAM(s) IV Intermittent every 6 hours  lactated ringers. 1000 milliLiter(s) (100 mL/Hr) IV Continuous <Continuous>    MEDICATIONS  (PRN):  acetaminophen    Suspension 905 milliGRAM(s) Oral every 6 hours PRN For Temp greater than 38 C (100.4 F)  acetaminophen    Suspension. 905 milliGRAM(s) Oral every 4 hours PRN Severe Pain (7 - 10)  bisacodyl 5 milliGRAM(s) Oral daily PRN Constipation  docusate sodium 100 milliGRAM(s) Oral three times a day PRN Constipation    < from: MR Cervical Spine w/wo IV Cont (04.22.18 @ 11:23) >  FINDINGS: There is straightening of the normal cervical lordosis. There   is disc desiccation at C2-3 through C6-7.    C2-3: Minimal left uncovertebral joint hypertrophy narrows the left   neural foramen.    See 34: Circumferential disc bulge and posterior osteophyte formation.   Mild bilateral uncovertebral joint hypertrophy. Mild central canal and   mild left neural foraminal stenosis.    C45: Central disc herniation with extruded disc material extending   superiorly behind the C4 vertebral body. Extruded disc material results   in compression of the spinal cord. Increased signal intensity is present   within the spinal cord consistent with gliosis and/or edema from spinal   cord compression. The neural foramina are patent.    See 56: Anterior and posterior osteophytes, disc space narrowing and a   posterior osteophytic ridging. Uncovertebral joint hypertrophy   bilaterally. Moderate central canal, severe right and moderate left   neural foraminal stenosis with flattening of the spinal cord.    C6-7: Minimal disc bulge. Neurology Follow up note    Patient is a 47y old  Male who presents with a chief complaint of worsening ataxic gait x 2 days, neck/lower back stiffness x 2 weeks s/p heavy lifting (21 Apr 2018 16:39)    Subjective:Interval History - No events overnight. denies any bladder or bowel issues. still having tingling hands b/l and LE b/w.  mild improvement in strength reported.  did not go to surgery as expected per ortho, nsurg consulted, held NPO overnight    Vital Signs Last 24 Hrs  T(C): 36.4 (24 Apr 2018 07:23), Max: 37.1 (23 Apr 2018 15:55)  T(F): 97.5 (24 Apr 2018 07:23), Max: 98.7 (23 Apr 2018 15:55)  HR: 82 (24 Apr 2018 07:23) (80 - 102)  BP: 126/70 (24 Apr 2018 07:23) (118/75 - 128/80)  RR: 18 (24 Apr 2018 07:23) (17 - 18)  SpO2: 94% (24 Apr 2018 07:23) (94% - 98%)    General Exam:   General appearance: No acute distress                   Neurological Exam:  Mental Status: Orientated to self, date and place.  Attention intact.  No dysarthria, speech fluent. Follows simple and complex commands.    Cranial Nerves:  PERRL, EOMI, VFF, no nystagmus.  No APD.    CN V1-3 intact to light touch.  No facial asymmetry.  Hearing intact bilaterally.  Tongue midline.  Shoulder shrug intact bilaterally.    Motor: Normal tone. No drift. 4/5 RUE, 4/5 LUE triceps, delt 5/5 bilat, 5/5 bilat wrist ext/flexors, 5/5 finger flex/ext. 5/5 bilat HF 4+, o/w LE throughout.           Coord: No dysmetria on finger-nose-finger                           15.5   12.8  )-----------( 336      ( 23 Apr 2018 04:41 )             46.3   04-23    138  |  100  |  16  ----------------------------<  161<H>  4.1   |  24  |  0.99    Ca    9.9      23 Apr 2018 04:41      Tremor: No resting, postural or action tremor.  No myoclonus.    Sensation: intact to light touch, pinprick, vibration and proprioception    Deep Tendon Reflexes: triple flexion RUE 3+, LUE 3+ (bi, tri, brach, pat, FF), 3+ patellar bilat, 4+ ankle bilat with 3-4 beats clonus    Gait: normal and stable.      Other:  Radiology          MEDICATIONS  (STANDING):  dexamethasone  IVPB 10 milliGRAM(s) IV Intermittent every 6 hours  lactated ringers. 1000 milliLiter(s) (100 mL/Hr) IV Continuous <Continuous>    MEDICATIONS  (PRN):  acetaminophen    Suspension 905 milliGRAM(s) Oral every 6 hours PRN For Temp greater than 38 C (100.4 F)  acetaminophen    Suspension. 905 milliGRAM(s) Oral every 4 hours PRN Severe Pain (7 - 10)  bisacodyl 5 milliGRAM(s) Oral daily PRN Constipation  docusate sodium 100 milliGRAM(s) Oral three times a day PRN Constipation    < from: MR Cervical Spine w/wo IV Cont (04.22.18 @ 11:23) >  FINDINGS: There is straightening of the normal cervical lordosis. There   is disc desiccation at C2-3 through C6-7.    C2-3: Minimal left uncovertebral joint hypertrophy narrows the left   neural foramen.    See 34: Circumferential disc bulge and posterior osteophyte formation.   Mild bilateral uncovertebral joint hypertrophy. Mild central canal and   mild left neural foraminal stenosis.    C45: Central disc herniation with extruded disc material extending   superiorly behind the C4 vertebral body. Extruded disc material results   in compression of the spinal cord. Increased signal intensity is present   within the spinal cord consistent with gliosis and/or edema from spinal   cord compression. The neural foramina are patent.    See 56: Anterior and posterior osteophytes, disc space narrowing and a   posterior osteophytic ridging. Uncovertebral joint hypertrophy   bilaterally. Moderate central canal, severe right and moderate left   neural foraminal stenosis with flattening of the spinal cord.    C6-7: Minimal disc bulge.

## 2018-04-24 NOTE — PROGRESS NOTE ADULT - SUBJECTIVE AND OBJECTIVE BOX
This is the first [facility] admission for this 47-year-old male who presents with a 2-week history of neck pain.  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  Of late, he notes that he had a rock fall on the back of his neck while at work.  He last worked 3 days ago, but has diminishing capacity.  The patient notes that approximately 4 days ago he started developing weakness in arms and legs.  He was having difficulty walking.  He was having difficulty holding objects in his left hand.  He has also developed numbness and tingling in his legs and left arm.  He has constant discomfort in his left arm around the clock and rapidly progressive weakness in his left arm.  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  The patient has been advised surgical decompression, stabilization procedure.  In addition on MRI, he has disease extending into C3-C4 and also below at C5-C6 but not of the extent of the C4-C5 level.    MEDICATIONS  (STANDING):  dexamethasone  IVPB 10 milliGRAM(s) IV Intermittent every 6 hours  dextrose 50% Injectable 25 Gram(s) IV Push once  dextrose 50% Injectable 25 Gram(s) IV Push once  insulin lispro (HumaLOG) corrective regimen sliding scale   SubCutaneous at bedtime  lactated ringers. 1000 milliLiter(s) (100 mL/Hr) IV Continuous <Continuous>  pantoprazole    Tablet 40 milliGRAM(s) Oral before breakfast    MEDICATIONS  (PRN):  acetaminophen    Suspension 905 milliGRAM(s) Oral every 6 hours PRN For Temp greater than 38 C (100.4 F)  acetaminophen    Suspension. 905 milliGRAM(s) Oral every 4 hours PRN Severe Pain (7 - 10)  bisacodyl 5 milliGRAM(s) Oral daily PRN Constipation  docusate sodium 100 milliGRAM(s) Oral three times a day PRN Constipation  glucagon  Injectable 1 milliGRAM(s) IntraMuscular once PRN Glucose LESS THAN 70 milligrams/deciliter  Vital Signs Last 24 Hrs  T(C): 36.8 (24 Apr 2018 19:51), Max: 36.8 (24 Apr 2018 19:51)  T(F): 98.3 (24 Apr 2018 19:51), Max: 98.3 (24 Apr 2018 19:51)  HR: 77 (24 Apr 2018 19:51) (72 - 94)  BP: 110/68 (24 Apr 2018 19:51) (110/68 - 137/79)  BP(mean): --  RR: 18 (24 Apr 2018 19:51) (18 - 18)  SpO2: 99% (24 Apr 2018 19:51) (94% - 99%)          PHYSICAL EXAMINATION:  At this time shows, VITAL SIGNS:  Blood pressure of 130/90, pulse is 100, respirations are 19.  He is afebrile.    Head:  Examination is normal.    Neck:  He has decreased range of motion with rigidity and tenderness in the mid cervical spine.    CHEST:  He has good breath sounds bilaterally.    CARDIAC:  Examination without gallops or murmurs.    ABDOMEN:  Soft without masses, tenderness, guarding or rebound.    EXTREMITIES:  Without clubbing, cyanosis or edema.    NEUROLOGICAL:  Examination shows 3/5 strength in the left arm, 4/5 strength in the right arm, 2/5 strength in the left leg and 4/5 strength in the right leg.  He has moderate to significant weakness as described.  Sensation is intact to touch and position.      LABS:          CBC Full  -  ( 23 Apr 2018 04:41 )  WBC Count : 12.8 K/uL  Hemoglobin : 15.5 g/dL  Hematocrit : 46.3 %  Platelet Count - Automated : 336 K/uL  Mean Cell Volume : 78.4 fl  Mean Cell Hemoglobin : 26.3 pg  Mean Cell Hemoglobin Concentration : 33.5 gm/dL  Auto Neutrophil # : x  Auto Lymphocyte # : x  Auto Monocyte # : x  Auto Eosinophil # : x  Auto Basophil # : x  Auto Neutrophil % : x  Auto Lymphocyte % : x  Auto Monocyte % : x  Auto Eosinophil % : x  Auto Basophil % : x    04-23    138  |  100  |  16  ----------------------------<  161<H>  4.1   |  24  |  0.99    Ca    9.9      23 Apr 2018 04:41        PT/INR - ( 23 Apr 2018 04:41 )   PT: 11.8 sec;   INR: 1.08 ratio This is the first [facility] admission for this 47-year-old male who presents with a 2-week history of neck pain.  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  Of late, he notes that he had a rock fall on the back of his neck while at work.  He last worked 3 days ago, but has diminishing capacity.  The patient notes that approximately 4 days ago he started developing weakness in arms and legs.  He was having difficulty walking.  He was having difficulty holding objects in his left hand.  He has also developed numbness and tingling in his legs and left arm.  He has constant discomfort in his left arm around the clock and rapidly progressive weakness in his left arm.  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  The patient has been advised surgical decompression, stabilization procedure.  In addition on MRI, he has disease extending into C3-C4 and also below at C5-C6 but not of the extent of the C4-C5 level. Symptoms have improved with high dose IV Decadron.    MEDICATIONS  (STANDING):  dexamethasone  IVPB 10 milliGRAM(s) IV Intermittent every 6 hours  dextrose 50% Injectable 25 Gram(s) IV Push once  dextrose 50% Injectable 25 Gram(s) IV Push once  insulin lispro (HumaLOG) corrective regimen sliding scale   SubCutaneous at bedtime  lactated ringers. 1000 milliLiter(s) (100 mL/Hr) IV Continuous <Continuous>  pantoprazole    Tablet 40 milliGRAM(s) Oral before breakfast    MEDICATIONS  (PRN):  acetaminophen    Suspension 905 milliGRAM(s) Oral every 6 hours PRN For Temp greater than 38 C (100.4 F)  acetaminophen    Suspension. 905 milliGRAM(s) Oral every 4 hours PRN Severe Pain (7 - 10)  bisacodyl 5 milliGRAM(s) Oral daily PRN Constipation  docusate sodium 100 milliGRAM(s) Oral three times a day PRN Constipation  glucagon  Injectable 1 milliGRAM(s) IntraMuscular once PRN Glucose LESS THAN 70 milligrams/deciliter  Vital Signs Last 24 Hrs  T(C): 36.8 (24 Apr 2018 19:51), Max: 36.8 (24 Apr 2018 19:51)  T(F): 98.3 (24 Apr 2018 19:51), Max: 98.3 (24 Apr 2018 19:51)  HR: 77 (24 Apr 2018 19:51) (72 - 94)  BP: 110/68 (24 Apr 2018 19:51) (110/68 - 137/79)  BP(mean): --  RR: 18 (24 Apr 2018 19:51) (18 - 18)  SpO2: 99% (24 Apr 2018 19:51) (94% - 99%)          PHYSICAL EXAMINATION:  At this time shows, VITAL SIGNS:  Blood pressure of 130/90, pulse is 100, respirations are 19.  He is afebrile.    Head:  Examination is normal.    Neck:  He has decreased range of motion with rigidity and tenderness in the mid cervical spine.    CHEST:  He has good breath sounds bilaterally.    CARDIAC:  Examination without gallops or murmurs.    ABDOMEN:  Soft without masses, tenderness, guarding or rebound.    EXTREMITIES:  Without clubbing, cyanosis or edema.    NEUROLOGICAL:  Examination shows 3/5 strength in the left arm, 4/5 strength in the right arm, 2/5 strength in the left leg and 4/5 strength in the right leg.  He has moderate to significant weakness as described.  Sensation is intact to touch and position.      LABS:          CBC Full  -  ( 23 Apr 2018 04:41 )  WBC Count : 12.8 K/uL  Hemoglobin : 15.5 g/dL  Hematocrit : 46.3 %  Platelet Count - Automated : 336 K/uL  Mean Cell Volume : 78.4 fl  Mean Cell Hemoglobin : 26.3 pg  Mean Cell Hemoglobin Concentration : 33.5 gm/dL  Auto Neutrophil # : x  Auto Lymphocyte # : x  Auto Monocyte # : x  Auto Eosinophil # : x  Auto Basophil # : x  Auto Neutrophil % : x  Auto Lymphocyte % : x  Auto Monocyte % : x  Auto Eosinophil % : x  Auto Basophil % : x    04-23    138  |  100  |  16  ----------------------------<  161<H>  4.1   |  24  |  0.99    Ca    9.9      23 Apr 2018 04:41        PT/INR - ( 23 Apr 2018 04:41 )   PT: 11.8 sec;   INR: 1.08 ratio

## 2018-04-24 NOTE — PROGRESS NOTE ADULT - ASSESSMENT
46 yo right-handed man who presents to [facility] w/ 2 day onset of gait ataxia (no heel to toe when walking). This was preceded by 2 week onset of neck and lower back stiffness x 2 weeks one day after carrying heavy objects up a stair case. Patient is a  by profession and has no pertinent PMH/PSH surgery. ROS also revealed intermittent tingling pain into the left axilla when neck flexion. MR. Cervical spine revealed C4-C5 cord compression with edema. Exam notable for mild UE>LE symmetrical weakness and brisk reflexes throughout. 48 yo right-handed man who presents to [facility] w/ 2 day onset of gait ataxia (no heel to toe when walking). This was preceded by 2 week onset of neck and lower back stiffness x 2 weeks one day after carrying heavy objects up a stair case. Patient is a  by profession and has no pertinent PMH/PSH surgery. ROS also revealed intermittent tingling pain into the left axilla when neck flexion. MR. Cervical spine revealed C4-C5 cord compression with edema. Exam notable for mild UE>LE symmetrical weakness and brisk reflexes throughout.    Mild improvement p decadron.  awaiting surgical intervention.  ortho and neurosurgery have been consulted, awaiting OR  NPO this AM.

## 2018-04-24 NOTE — PROGRESS NOTE ADULT - ASSESSMENT
47M here with ataxic gait found to have multilevel cervical spinal disc herniation worst a C4/5 with cord compression and cord signal change 47M here with ataxic gait found to have multilevel cervical spinal disc herniation worst a C4/5 with cord compression and cord signal change. Awaiting surgical decompression and stabilization.

## 2018-04-24 NOTE — PROGRESS NOTE ADULT - ASSESSMENT
47M here with ataxic gait found to have multilevel cervical spinal disc herniation worst a C4/5 with cord compression and cord signal change    - Plan for C4-6 ACDF with Dr. Sandy today  - Preop for OR today: NPO, repeat Type and screen, medical optimization noted in chart, IVF, hold chemoppx for DVT  - Q4hr neurochecks  - Pain control  - Discussed with patient risks, benefits, alternatives to surgery. All questions answered. He is agreeable to proceed. 47M here with ataxic gait found to have multilevel cervical spinal disc herniation worst a C4/5 with cord compression and cord signal change    - Plan for C4-6 ACDF with Dr. Sandy tomorrow  - Preop for OR tomororw: NPO after midnight, repeat Type and screen, medical optimization noted in chart, IVF, hold chemoppx for DVT  - Q4hr neurochecks  - Pain control  - Discussed with patient risks, benefits, alternatives to surgery. All questions answered. He is agreeable to proceed.

## 2018-04-24 NOTE — PROGRESS NOTE ADULT - ATTENDING COMMENTS
I saw and evaluated the patient. I reviewed the resident's note and agree. The patient is a 47-year-old male who presents with neck pain and difficulty walking, found to have C4-C6 herniated discs resulting in spinal cord compression and spinal cord signal change. The patient's neurologic exam is significant for a myelopathic gait and hyperreflexia. The risks, benefits, and alternatives to surgery via a C4-C6 ACDF were discussed with the patient, who expressed understanding, and wishes to proceed with surgery either today or tomorrow depending on OR availability. I saw and evaluated the patient. I reviewed the resident's note and agree. The patient is a 47-year-old male who presents with neck pain and difficulty walking, found to have C4-C6 herniated discs resulting in spinal cord compression and spinal cord signal change. The patient's neurologic exam is significant for a myelopathic gait and hyperreflexia. The risks, benefits, and alternatives to surgery via a C4-C6 ACDF were discussed with the patient, who expressed understanding, and wishes to proceed with surgery tomorrow.

## 2018-04-24 NOTE — PROGRESS NOTE ADULT - PROBLEM SELECTOR PLAN 1
scheduled for OR today  No contraindication to te scheduled procedure scheduled for OR.  No contraindication to te scheduled procedure

## 2018-04-24 NOTE — CHART NOTE - NSCHARTNOTEFT_GEN_A_CORE
Planned case was cancelled 4/23/18 with Dr. Reyes secondary to OR availability.  Will proceed with ACDF 4/24/18 with neurosurgery Dr. Sandy instead since Dr. Reyes not available until later in the week.

## 2018-04-24 NOTE — PROGRESS NOTE ADULT - PROBLEM SELECTOR PLAN 1
decadron 10mg q6 hrs.  Protonix for GI ppx  Pt has no hx of diabetes. Will check blood glucose daily and HISS as needed.   Neurosurgery planned for surgery 4/25. Confirmed with Neurosurgery.  Fall precautions  DVT ppx- holding lovenox for today for possible surgery. Intermittent compression stockings in the meantime. decadron 10mg q6 hrs.  Protonix for GI ppx  Pt has no hx of diabetes. Will check blood glucose daily and HISS as needed.   Neurosurgery planned for surgery 4/25. Npo after midnight and pre-op labs ordered.  - Confirmed with Neurosurgery.  Fall precautions  DVT ppx- holding lovenox for today for possible surgery. Intermittent compression stockings in the meantime.

## 2018-04-25 ENCOUNTER — APPOINTMENT (OUTPATIENT)
Dept: SPINE | Facility: HOSPITAL | Age: 48
End: 2018-04-25

## 2018-04-25 LAB
GLUCOSE BLDC GLUCOMTR-MCNC: 119 MG/DL — HIGH (ref 70–99)
GLUCOSE BLDC GLUCOMTR-MCNC: 135 MG/DL — HIGH (ref 70–99)
GLUCOSE BLDC GLUCOMTR-MCNC: 143 MG/DL — HIGH (ref 70–99)

## 2018-04-25 PROCEDURE — 22551 ARTHRD ANT NTRBDY CERVICAL: CPT

## 2018-04-25 PROCEDURE — 22552 ARTHRD ANT NTRBD CERVICAL EA: CPT

## 2018-04-25 PROCEDURE — 22853 INSJ BIOMECHANICAL DEVICE: CPT

## 2018-04-25 PROCEDURE — 99231 SBSQ HOSP IP/OBS SF/LOW 25: CPT

## 2018-04-25 RX ORDER — GLUCAGON INJECTION, SOLUTION 0.5 MG/.1ML
1 INJECTION, SOLUTION SUBCUTANEOUS ONCE
Qty: 0 | Refills: 0 | Status: DISCONTINUED | OUTPATIENT
Start: 2018-04-25 | End: 2018-04-26

## 2018-04-25 RX ORDER — DEXAMETHASONE 0.5 MG/5ML
ELIXIR ORAL
Qty: 0 | Refills: 0 | Status: DISCONTINUED | OUTPATIENT
Start: 2018-04-26 | End: 2018-04-26

## 2018-04-25 RX ORDER — DEXTROSE 50 % IN WATER 50 %
12.5 SYRINGE (ML) INTRAVENOUS ONCE
Qty: 0 | Refills: 0 | Status: DISCONTINUED | OUTPATIENT
Start: 2018-04-25 | End: 2018-04-26

## 2018-04-25 RX ORDER — ONDANSETRON 8 MG/1
4 TABLET, FILM COATED ORAL ONCE
Qty: 0 | Refills: 0 | Status: DISCONTINUED | OUTPATIENT
Start: 2018-04-25 | End: 2018-04-26

## 2018-04-25 RX ORDER — ACETAMINOPHEN 500 MG
650 TABLET ORAL EVERY 6 HOURS
Qty: 0 | Refills: 0 | Status: DISCONTINUED | OUTPATIENT
Start: 2018-04-25 | End: 2018-04-26

## 2018-04-25 RX ORDER — INSULIN LISPRO 100/ML
VIAL (ML) SUBCUTANEOUS
Qty: 0 | Refills: 0 | Status: DISCONTINUED | OUTPATIENT
Start: 2018-04-25 | End: 2018-04-26

## 2018-04-25 RX ORDER — SODIUM CHLORIDE 9 MG/ML
1000 INJECTION, SOLUTION INTRAVENOUS
Qty: 0 | Refills: 0 | Status: DISCONTINUED | OUTPATIENT
Start: 2018-04-25 | End: 2018-04-26

## 2018-04-25 RX ORDER — DEXTROSE 50 % IN WATER 50 %
1 SYRINGE (ML) INTRAVENOUS ONCE
Qty: 0 | Refills: 0 | Status: DISCONTINUED | OUTPATIENT
Start: 2018-04-25 | End: 2018-04-26

## 2018-04-25 RX ORDER — SENNA PLUS 8.6 MG/1
2 TABLET ORAL AT BEDTIME
Qty: 0 | Refills: 0 | Status: DISCONTINUED | OUTPATIENT
Start: 2018-04-25 | End: 2018-04-26

## 2018-04-25 RX ORDER — HYDROMORPHONE HYDROCHLORIDE 2 MG/ML
0.5 INJECTION INTRAMUSCULAR; INTRAVENOUS; SUBCUTANEOUS
Qty: 0 | Refills: 0 | Status: DISCONTINUED | OUTPATIENT
Start: 2018-04-25 | End: 2018-04-26

## 2018-04-25 RX ORDER — DEXTROSE 50 % IN WATER 50 %
25 SYRINGE (ML) INTRAVENOUS ONCE
Qty: 0 | Refills: 0 | Status: DISCONTINUED | OUTPATIENT
Start: 2018-04-25 | End: 2018-04-26

## 2018-04-25 RX ORDER — DEXTROSE MONOHYDRATE, SODIUM CHLORIDE, AND POTASSIUM CHLORIDE 50; .745; 4.5 G/1000ML; G/1000ML; G/1000ML
1000 INJECTION, SOLUTION INTRAVENOUS
Qty: 0 | Refills: 0 | Status: DISCONTINUED | OUTPATIENT
Start: 2018-04-25 | End: 2018-04-26

## 2018-04-25 RX ORDER — DOCUSATE SODIUM 100 MG
100 CAPSULE ORAL THREE TIMES A DAY
Qty: 0 | Refills: 0 | Status: DISCONTINUED | OUTPATIENT
Start: 2018-04-25 | End: 2018-04-26

## 2018-04-25 RX ORDER — CEFAZOLIN SODIUM 1 G
1000 VIAL (EA) INJECTION EVERY 8 HOURS
Qty: 0 | Refills: 0 | Status: DISCONTINUED | OUTPATIENT
Start: 2018-04-26 | End: 2018-04-26

## 2018-04-25 RX ORDER — PANTOPRAZOLE SODIUM 20 MG/1
40 TABLET, DELAYED RELEASE ORAL DAILY
Qty: 0 | Refills: 0 | Status: DISCONTINUED | OUTPATIENT
Start: 2018-04-25 | End: 2018-04-26

## 2018-04-25 RX ORDER — ONDANSETRON 8 MG/1
4 TABLET, FILM COATED ORAL EVERY 6 HOURS
Qty: 0 | Refills: 0 | Status: DISCONTINUED | OUTPATIENT
Start: 2018-04-25 | End: 2018-04-26

## 2018-04-25 RX ADMIN — Medication 102 MILLIGRAM(S): at 05:11

## 2018-04-25 RX ADMIN — HYDROMORPHONE HYDROCHLORIDE 0.5 MILLIGRAM(S): 2 INJECTION INTRAMUSCULAR; INTRAVENOUS; SUBCUTANEOUS at 23:30

## 2018-04-25 RX ADMIN — Medication 102 MILLIGRAM(S): at 12:49

## 2018-04-25 RX ADMIN — HYDROMORPHONE HYDROCHLORIDE 0.5 MILLIGRAM(S): 2 INJECTION INTRAMUSCULAR; INTRAVENOUS; SUBCUTANEOUS at 23:45

## 2018-04-25 RX ADMIN — DEXTROSE MONOHYDRATE, SODIUM CHLORIDE, AND POTASSIUM CHLORIDE 75 MILLILITER(S): 50; .745; 4.5 INJECTION, SOLUTION INTRAVENOUS at 20:57

## 2018-04-25 RX ADMIN — PANTOPRAZOLE SODIUM 40 MILLIGRAM(S): 20 TABLET, DELAYED RELEASE ORAL at 05:11

## 2018-04-25 NOTE — PROGRESS NOTE ADULT - ATTENDING COMMENTS
I saw and evaluated the patient. I reviewed the resident's note and agree. The patient is a 47-year-old male who presents with neck pain and difficulty walking, found to have C4-C6 herniated discs resulting in spinal cord compression and spinal cord signal change. The patient's neurologic exam is significant for a myelopathic gait and hyperreflexia. The risks, benefits, and alternatives to surgery via a C4-C6 ACDF were discussed with the patient, who expressed understanding, and wishes to proceed with surgery today.

## 2018-04-25 NOTE — PROGRESS NOTE ADULT - ASSESSMENT
47M here with ataxic gait found to have multilevel cervical spinal disc herniation worst a C4/5 with cord compression and cord signal change    - Plan for C4-6 ACDF with Dr. Sandy today  - Q4hr neurochecks  - Pain control

## 2018-04-25 NOTE — PROGRESS NOTE ADULT - ASSESSMENT
46 yo right-handed man who presents to [facility] w/ 2 day onset of gait ataxia (no heel to toe when walking). This was preceded by 2 week onset of neck and lower back stiffness x 2 weeks one day after carrying heavy objects up a stair case. Patient is a  by profession and has no pertinent PMH/PSH surgery. ROS also revealed intermittent tingling pain into the left axilla when neck flexion. MR. Cervical spine revealed C4-C5 cord compression with edema. Exam notable for mild UE>LE symmetrical weakness and brisk reflexes throughout.    Mild improvement w decadron.  OR today 4/25 48 yo right-handed man who presents to [facility] w/ 2 day onset of gait ataxia (no heel to toe when walking). This was preceded by 2 week onset of neck and lower back stiffness x 2 weeks one day after carrying heavy objects up a stair case. Patient is a  by profession and has no pertinent PMH/PSH surgery. ROS also revealed intermittent tingling pain into the left axilla when neck flexion. MR. Cervical spine revealed C4-C5 cord compression with edema. Exam notable for mild UE>LE symmetrical weakness and brisk reflexes throughout.    Mild improvement w decadron.  Agree with urgent decompression.  OR today 4/25  To nsurg service p op and rehab

## 2018-04-25 NOTE — BRIEF OPERATIVE NOTE - PROCEDURE
<<-----Click on this checkbox to enter Procedure Anterior cervical discectomy  04/25/2018    Active  [id number]

## 2018-04-25 NOTE — PROGRESS NOTE ADULT - SUBJECTIVE AND OBJECTIVE BOX
Neurology Follow up note    Subjective:Interval History - No events overnight.  Nild improvement in strength reported.  OR this afternoon.    Vital Signs Last 24 Hrs  T(C): 36.9 (25 Apr 2018 08:06), Max: 36.9 (25 Apr 2018 08:06)  T(F): 98.4 (25 Apr 2018 08:06), Max: 98.4 (25 Apr 2018 08:06)  HR: 80 (25 Apr 2018 08:06) (64 - 94)  BP: 126/87 (25 Apr 2018 08:06) (93/54 - 137/79)  BP(mean): --  RR: 18 (25 Apr 2018 08:06) (18 - 18)  SpO2: 96% (25 Apr 2018 08:06) (95% - 99%)    General Exam:   General appearance: No acute distress                   Neurological Exam:  Mental Status: Orientated to self, date and place.  Attention intact.  No dysarthria, speech fluent. Follows simple and complex commands.    Cranial Nerves:  PERRL, EOMI, VFF, face symmetric    Motor: Normal tone. No drift. 4/5 RUE, 4/5 LUE triceps, delt 5/5 bilat, 5/5 bilat wrist ext/flexors, 5/5 finger flex/ext. 5/5 bilat HF 4+, o/w LE throughout.           Coord: No dysmetria on finger-nose-finger     Tremor: No resting, postural or action tremor.  No myoclonus.    Sensation: intact to light touch, pinprick, vibration and proprioception    Deep Tendon Reflexes: triple flexion RUE 3+, LUE 3+ (bi, tri, brach, pat, FF), 3+ patellar bilat, 4+ ankle bilat with 3-4 beats clonus    Other:  Radiology    MEDICATIONS  (STANDING):  dexamethasone  IVPB 10 milliGRAM(s) IV Intermittent every 6 hours  dextrose 50% Injectable 25 Gram(s) IV Push once  dextrose 50% Injectable 25 Gram(s) IV Push once  insulin lispro (HumaLOG) corrective regimen sliding scale   SubCutaneous at bedtime  lactated ringers. 1000 milliLiter(s) (100 mL/Hr) IV Continuous <Continuous>  pantoprazole    Tablet 40 milliGRAM(s) Oral before breakfast    MEDICATIONS  (PRN):  acetaminophen    Suspension 905 milliGRAM(s) Oral every 6 hours PRN For Temp greater than 38 C (100.4 F)  acetaminophen    Suspension. 905 milliGRAM(s) Oral every 4 hours PRN Severe Pain (7 - 10)  bisacodyl 5 milliGRAM(s) Oral daily PRN Constipation  docusate sodium 100 milliGRAM(s) Oral three times a day PRN Constipation  glucagon  Injectable 1 milliGRAM(s) IntraMuscular once PRN Glucose LESS THAN 70 milligrams/deciliter      < from: MR Cervical Spine w/wo IV Cont (04.22.18 @ 11:23) >  FINDINGS: There is straightening of the normal cervical lordosis. There   is disc desiccation at C2-3 through C6-7.    C2-3: Minimal left uncovertebral joint hypertrophy narrows the left   neural foramen.    See 34: Circumferential disc bulge and posterior osteophyte formation.   Mild bilateral uncovertebral joint hypertrophy. Mild central canal and   mild left neural foraminal stenosis.    C45: Central disc herniation with extruded disc material extending   superiorly behind the C4 vertebral body. Extruded disc material results   in compression of the spinal cord. Increased signal intensity is present   within the spinal cord consistent with gliosis and/or edema from spinal   cord compression. The neural foramina are patent.    See 56: Anterior and posterior osteophytes, disc space narrowing and a   posterior osteophytic ridging. Uncovertebral joint hypertrophy   bilaterally. Moderate central canal, severe right and moderate left   neural foraminal stenosis with flattening of the spinal cord.    C6-7: Minimal disc bulge. Neurology Follow up note    Subjective:Interval History - No events overnight.  No change in / no improvement in strength reported.  OR this afternoon.    Vital Signs Last 24 Hrs  T(C): 36.9 (25 Apr 2018 08:06), Max: 36.9 (25 Apr 2018 08:06)  T(F): 98.4 (25 Apr 2018 08:06), Max: 98.4 (25 Apr 2018 08:06)  HR: 80 (25 Apr 2018 08:06) (64 - 94)  BP: 126/87 (25 Apr 2018 08:06) (93/54 - 137/79)  RR: 18 (25 Apr 2018 08:06) (18 - 18)  SpO2: 96% (25 Apr 2018 08:06) (95% - 99%)    General Exam:   General appearance: No acute distress                   Neurological Exam:  Mental Status: Orientated to self, date and place.  Attention intact.  No dysarthria, speech fluent. Follows simple and complex commands.    Cranial Nerves:  PERRL, EOMI, VFF, face symmetric    Motor: Normal tone. No drift. 4/5 RUE, 4/5 LUE triceps, delt 5/5 bilat, 5/5 bilat wrist ext/flexors, 5/5 finger flex/ext. 5/5 bilat HF 4+, o/w LE throughout.           Coord: No dysmetria on finger-nose-finger     Tremor: No resting, postural or action tremor.  No myoclonus.    Sensation: intact to light touch, pinprick, vibration and proprioception    Deep Tendon Reflexes: triple flexion RUE 3+, LUE 3+ (bi, tri, brach, pat, FF), 3+ patellar bilat, 4+ ankle bilat with 3-4 beats clonus    Other:  Radiology    MEDICATIONS  (STANDING):  dexamethasone  IVPB 10 milliGRAM(s) IV Intermittent every 6 hours  dextrose 50% Injectable 25 Gram(s) IV Push once  dextrose 50% Injectable 25 Gram(s) IV Push once  insulin lispro (HumaLOG) corrective regimen sliding scale   SubCutaneous at bedtime  lactated ringers. 1000 milliLiter(s) (100 mL/Hr) IV Continuous <Continuous>  pantoprazole    Tablet 40 milliGRAM(s) Oral before breakfast    MEDICATIONS  (PRN):  acetaminophen    Suspension 905 milliGRAM(s) Oral every 6 hours PRN For Temp greater than 38 C (100.4 F)  acetaminophen    Suspension. 905 milliGRAM(s) Oral every 4 hours PRN Severe Pain (7 - 10)  bisacodyl 5 milliGRAM(s) Oral daily PRN Constipation  docusate sodium 100 milliGRAM(s) Oral three times a day PRN Constipation  glucagon  Injectable 1 milliGRAM(s) IntraMuscular once PRN Glucose LESS THAN 70 milligrams/deciliter      < from: MR Cervical Spine w/wo IV Cont (04.22.18 @ 11:23) >  FINDINGS: There is straightening of the normal cervical lordosis. There   is disc desiccation at C2-3 through C6-7.    C2-3: Minimal left uncovertebral joint hypertrophy narrows the left   neural foramen.    See 34: Circumferential disc bulge and posterior osteophyte formation.   Mild bilateral uncovertebral joint hypertrophy. Mild central canal and   mild left neural foraminal stenosis.    C45: Central disc herniation with extruded disc material extending   superiorly behind the C4 vertebral body. Extruded disc material results   in compression of the spinal cord. Increased signal intensity is present   within the spinal cord consistent with gliosis and/or edema from spinal   cord compression. The neural foramina are patent.    See 56: Anterior and posterior osteophytes, disc space narrowing and a   posterior osteophytic ridging. Uncovertebral joint hypertrophy   bilaterally. Moderate central canal, severe right and moderate left   neural foraminal stenosis with flattening of the spinal cord.    C6-7: Minimal disc bulge.

## 2018-04-25 NOTE — PROGRESS NOTE ADULT - ASSESSMENT
47M here with ataxic gait found to have multilevel cervical spinal disc herniation worst a C4/5 with cord compression and cord signal change. Awaiting surgical decompression and stabilization.

## 2018-04-25 NOTE — PROGRESS NOTE ADULT - SUBJECTIVE AND OBJECTIVE BOX
Examined at bedside.    Vital Signs Last 24 Hrs  Vital Signs Last 24 Hrs  T(C): 36.6 (25 Apr 2018 05:06), Max: 36.8 (24 Apr 2018 19:51)  T(F): 97.8 (25 Apr 2018 05:06), Max: 98.3 (24 Apr 2018 19:51)  HR: 64 (25 Apr 2018 05:06) (64 - 94)  BP: 106/61 (25 Apr 2018 05:06) (93/54 - 137/79)  BP(mean): --  RR: 18 (25 Apr 2018 05:06) (18 - 18)  SpO2: 97% (25 Apr 2018 05:06) (94% - 99%)    General Examination:     Neurologic Examination:           PHYSICAL EXAM:    Constitutional: No Acute Distress     Neurological: AOx3, Following Commands    Motor exam:          Upper extremity                         Delt     Bicep     Tricep    HG                                                 R         5/5        5/5        5/5       5/5                                               L          5/5        5/5        5/5       5/5          Lower extremity                        HF         KF        KE       DF         PF                                                  R        5/5        5/5        5/5       5/5         5/5                                               L         5/5        5/5       5/5       5/5          5/5                                                 Sensation: [x] intact to light touch  [] decreased:     Bilateral baum's sign, bilateral clonus, Reflexes 3+ in lower extremities, 2+ in upper extremities

## 2018-04-25 NOTE — PROGRESS NOTE ADULT - PROBLEM SELECTOR PLAN 1
decadron 10mg q6 hrs.  Protonix for GI ppx  Pt has no hx of diabetes. Will check blood glucose daily and HISS as needed.   Neurosurgery planned for surgery today 4/25   Fall precautions  DVT ppx- holding lovenox for surgery. Intermittent compression stockings in the meantime.

## 2018-04-25 NOTE — PROGRESS NOTE ADULT - SUBJECTIVE AND OBJECTIVE BOX
This is the first [facility] admission for this 47-year-old male who presents with a 2-week history of neck pain.  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  Of late, he notes that he had a rock fall on the back of his neck while at work.  He last worked 3 days ago, but has diminishing capacity.  The patient notes that approximately 4 days ago he started developing weakness in arms and legs.  He was having difficulty walking.  He was having difficulty holding objects in his left hand.  He has also developed numbness and tingling in his legs and left arm.  He has constant discomfort in his left arm around the clock and rapidly progressive weakness in his left arm.  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  The patient has been advised surgical decompression, stabilization procedure.  In addition on MRI, he has disease extending into C3-C4 and also below at C5-C6 but not of the extent of the C4-C5 level. Symptoms have improved with high dose IV Decadron. scheduled for ACDF today      MEDICATIONS  (STANDING):  dextrose 5%. 1000 milliLiter(s) (50 mL/Hr) IV Continuous <Continuous>  dextrose 50% Injectable 12.5 Gram(s) IV Push once  dextrose 50% Injectable 25 Gram(s) IV Push once  dextrose 50% Injectable 25 Gram(s) IV Push once  docusate sodium 100 milliGRAM(s) Oral three times a day  insulin lispro (HumaLOG) corrective regimen sliding scale   SubCutaneous three times a day before meals  pantoprazole    Tablet 40 milliGRAM(s) Oral daily  sodium chloride 0.9% with potassium chloride 20 mEq/L 1000 milliLiter(s) (75 mL/Hr) IV Continuous <Continuous>    MEDICATIONS  (PRN):  acetaminophen   Tablet 650 milliGRAM(s) Oral every 6 hours PRN For Temp greater than 38 C (100.4 F)  acetaminophen   Tablet. 650 milliGRAM(s) Oral every 6 hours PRN Mild Pain (1 - 3)  dextrose Gel 1 Dose(s) Oral once PRN Blood Glucose LESS THAN 70 milliGRAM(s)/deciliter  glucagon  Injectable 1 milliGRAM(s) IntraMuscular once PRN Glucose LESS THAN 70 milligrams/deciliter  HYDROmorphone  Injectable 0.5 milliGRAM(s) IV Push every 10 minutes PRN Moderate Pain (4 - 6)  ondansetron Injectable 4 milliGRAM(s) IV Push once PRN Nausea and/or Vomiting  ondansetron Injectable 4 milliGRAM(s) IV Push every 6 hours PRN Nausea and/or Vomiting  senna 2 Tablet(s) Oral at bedtime PRN Constipation          VITALS:   T(C): 36.4 (04-25-18 @ 22:00), Max: 36.9 (04-25-18 @ 08:06)  HR: 98 (04-25-18 @ 22:00) (64 - 101)  BP: 135/83 (04-25-18 @ 22:00) (93/54 - 146/86)  RR: 16 (04-25-18 @ 22:00) (14 - 18)  SpO2: 96% (04-25-18 @ 22:00) (93% - 98%)  Wt(kg): --    PHYSICAL EXAMINATION:  At this time shows, VITAL SIGNS:  Blood pressure of 130/90, pulse is 100, respirations are 19.  He is afebrile.    Head:  Examination is normal.    Neck:  He has decreased range of motion with rigidity and tenderness in the mid cervical spine.    CHEST:  He has good breath sounds bilaterally.    CARDIAC:  Examination without gallops or murmurs.    ABDOMEN:  Soft without masses, tenderness, guarding or rebound.    EXTREMITIES:  Without clubbing, cyanosis or edema.    NEUROLOGICAL:  Examination shows 3/5 strength in the left arm, 4/5 strength in the right arm, 2/5 strength in the left leg and 4/5 strength in the right leg.  He has moderate to significant weakness as described.  Sensation is intact to touch and position.    LABS:                      CAPILLARY BLOOD GLUCOSE      POCT Blood Glucose.: 119 mg/dL (25 Apr 2018 12:22)  POCT Blood Glucose.: 143 mg/dL (25 Apr 2018 08:26)      RADIOLOGY & ADDITIONAL TESTS:

## 2018-04-26 ENCOUNTER — TRANSCRIPTION ENCOUNTER (OUTPATIENT)
Age: 48
End: 2018-04-26

## 2018-04-26 VITALS
TEMPERATURE: 99 F | RESPIRATION RATE: 20 BRPM | HEART RATE: 86 BPM | OXYGEN SATURATION: 97 % | SYSTOLIC BLOOD PRESSURE: 138 MMHG | DIASTOLIC BLOOD PRESSURE: 82 MMHG

## 2018-04-26 LAB
ANION GAP SERPL CALC-SCNC: 13 MMOL/L — SIGNIFICANT CHANGE UP (ref 5–17)
BUN SERPL-MCNC: 20 MG/DL — SIGNIFICANT CHANGE UP (ref 7–23)
CALCIUM SERPL-MCNC: 9 MG/DL — SIGNIFICANT CHANGE UP (ref 8.4–10.5)
CHLORIDE SERPL-SCNC: 99 MMOL/L — SIGNIFICANT CHANGE UP (ref 96–108)
CO2 SERPL-SCNC: 26 MMOL/L — SIGNIFICANT CHANGE UP (ref 22–31)
CREAT SERPL-MCNC: 1.07 MG/DL — SIGNIFICANT CHANGE UP (ref 0.5–1.3)
GLUCOSE BLDC GLUCOMTR-MCNC: 120 MG/DL — HIGH (ref 70–99)
GLUCOSE BLDC GLUCOMTR-MCNC: 132 MG/DL — HIGH (ref 70–99)
GLUCOSE SERPL-MCNC: 132 MG/DL — HIGH (ref 70–99)
HCT VFR BLD CALC: 47.1 % — SIGNIFICANT CHANGE UP (ref 39–50)
HGB BLD-MCNC: 15.5 G/DL — SIGNIFICANT CHANGE UP (ref 13–17)
MCHC RBC-ENTMCNC: 26.1 PG — LOW (ref 27–34)
MCHC RBC-ENTMCNC: 32.9 GM/DL — SIGNIFICANT CHANGE UP (ref 32–36)
MCV RBC AUTO: 79.4 FL — LOW (ref 80–100)
PLATELET # BLD AUTO: 311 K/UL — SIGNIFICANT CHANGE UP (ref 150–400)
POTASSIUM SERPL-MCNC: 4 MMOL/L — SIGNIFICANT CHANGE UP (ref 3.5–5.3)
POTASSIUM SERPL-SCNC: 4 MMOL/L — SIGNIFICANT CHANGE UP (ref 3.5–5.3)
RBC # BLD: 5.93 M/UL — HIGH (ref 4.2–5.8)
RBC # FLD: 12.3 % — SIGNIFICANT CHANGE UP (ref 10.3–14.5)
SODIUM SERPL-SCNC: 138 MMOL/L — SIGNIFICANT CHANGE UP (ref 135–145)
WBC # BLD: 17.2 K/UL — HIGH (ref 3.8–10.5)
WBC # FLD AUTO: 17.2 K/UL — HIGH (ref 3.8–10.5)

## 2018-04-26 PROCEDURE — 99231 SBSQ HOSP IP/OBS SF/LOW 25: CPT

## 2018-04-26 PROCEDURE — 72040 X-RAY EXAM NECK SPINE 2-3 VW: CPT | Mod: 26

## 2018-04-26 RX ORDER — BENZOCAINE AND MENTHOL 5; 1 G/100ML; G/100ML
1 LIQUID ORAL EVERY 4 HOURS
Qty: 0 | Refills: 0 | Status: DISCONTINUED | OUTPATIENT
Start: 2018-04-26 | End: 2018-04-26

## 2018-04-26 RX ORDER — DEXAMETHASONE 0.5 MG/5ML
4 ELIXIR ORAL EVERY 6 HOURS
Qty: 0 | Refills: 0 | Status: DISCONTINUED | OUTPATIENT
Start: 2018-04-26 | End: 2018-04-26

## 2018-04-26 RX ORDER — OXYCODONE HYDROCHLORIDE 5 MG/1
5 TABLET ORAL EVERY 4 HOURS
Qty: 0 | Refills: 0 | Status: DISCONTINUED | OUTPATIENT
Start: 2018-04-26 | End: 2018-04-26

## 2018-04-26 RX ORDER — OXYCODONE HYDROCHLORIDE 5 MG/1
10 TABLET ORAL EVERY 4 HOURS
Qty: 0 | Refills: 0 | Status: DISCONTINUED | OUTPATIENT
Start: 2018-04-26 | End: 2018-04-26

## 2018-04-26 RX ORDER — DEXAMETHASONE 0.5 MG/5ML
2 ELIXIR ORAL EVERY 12 HOURS
Qty: 0 | Refills: 0 | Status: CANCELLED | OUTPATIENT
Start: 2018-04-28 | End: 2018-04-26

## 2018-04-26 RX ORDER — ACETAMINOPHEN 500 MG
650 TABLET ORAL EVERY 6 HOURS
Qty: 0 | Refills: 0 | Status: DISCONTINUED | OUTPATIENT
Start: 2018-04-26 | End: 2018-04-26

## 2018-04-26 RX ORDER — ENOXAPARIN SODIUM 100 MG/ML
40 INJECTION SUBCUTANEOUS AT BEDTIME
Qty: 0 | Refills: 0 | Status: DISCONTINUED | OUTPATIENT
Start: 2018-04-26 | End: 2018-04-26

## 2018-04-26 RX ORDER — BENZOCAINE AND MENTHOL 5; 1 G/100ML; G/100ML
1 LIQUID ORAL
Qty: 12 | Refills: 0 | OUTPATIENT
Start: 2018-04-26

## 2018-04-26 RX ORDER — OXYCODONE HYDROCHLORIDE 5 MG/1
1 TABLET ORAL
Qty: 30 | Refills: 0 | OUTPATIENT
Start: 2018-04-26 | End: 2018-04-30

## 2018-04-26 RX ORDER — DEXAMETHASONE 0.5 MG/5ML
1 ELIXIR ORAL EVERY 12 HOURS
Qty: 0 | Refills: 0 | Status: CANCELLED | OUTPATIENT
Start: 2018-04-30 | End: 2018-04-26

## 2018-04-26 RX ORDER — ACETAMINOPHEN 500 MG
2 TABLET ORAL
Qty: 0 | Refills: 0 | COMMUNITY
Start: 2018-04-26

## 2018-04-26 RX ORDER — DEXAMETHASONE 0.5 MG/5ML
3 ELIXIR ORAL EVERY 8 HOURS
Qty: 0 | Refills: 0 | Status: DISCONTINUED | OUTPATIENT
Start: 2018-04-27 | End: 2018-04-26

## 2018-04-26 RX ADMIN — Medication 4 MILLIGRAM(S): at 06:11

## 2018-04-26 RX ADMIN — PANTOPRAZOLE SODIUM 40 MILLIGRAM(S): 20 TABLET, DELAYED RELEASE ORAL at 12:09

## 2018-04-26 RX ADMIN — Medication 100 MILLIGRAM(S): at 12:09

## 2018-04-26 RX ADMIN — Medication 650 MILLIGRAM(S): at 06:25

## 2018-04-26 RX ADMIN — Medication 100 MILLIGRAM(S): at 06:11

## 2018-04-26 RX ADMIN — BENZOCAINE AND MENTHOL 1 LOZENGE: 5; 1 LIQUID ORAL at 12:09

## 2018-04-26 RX ADMIN — Medication 100 MILLIGRAM(S): at 12:08

## 2018-04-26 RX ADMIN — Medication 4 MILLIGRAM(S): at 12:09

## 2018-04-26 RX ADMIN — Medication 650 MILLIGRAM(S): at 07:00

## 2018-04-26 NOTE — PROGRESS NOTE ADULT - PROBLEM SELECTOR PLAN 3
pain meds as needed  Physical therapy post op

## 2018-04-26 NOTE — PROGRESS NOTE ADULT - ATTENDING COMMENTS
I saw and evaluated the patient. The patient is a 47-year-old male s/p C4-C6 ACDF on 4/25/18. The patient is currently neurologically stable. D/C Hemovac drain. Continue steroid taper. Begin mobilization OOB with PT and disposition planning.

## 2018-04-26 NOTE — CHART NOTE - NSCHARTNOTEFT_GEN_A_CORE
Anterior neck HMV with 3cc output in last 24 hrs. HMV taken off suction and removed without resistance or difficulty. Dressing placed at exit site. Patient tolerated well.

## 2018-04-26 NOTE — PROGRESS NOTE ADULT - ASSESSMENT
HPI: 48 yo right-handed man who presents to [facility] w/ 2 day onset of gait ataxia (no heel to toe when walking). This was preceded by 2 week onset of neck and lower back stiffness x 2 weeks one day after carrying heavy objects up a stair case. Patient is a  by profession and has no pertinent PMH/PSH surgery. ROS also revealed intermittent tingling pain into the left axilla when neck flexion, otherwise unremarkable for headache, fever, chills, acute vision changes, trauma, LOC, chest pain, SOB, abdominal pain, N/V, loss of bowel/bladder, focal weakness, numbness. (21 Apr 2018 16:39)    PROCEDURE: 4/25 s/p C4-6 Anterior cervical discectomy with fusion; POD#1    PLAN:  -Will d/c HMV today  -Post operative standing lateral xrays pending  -Pain control with Oxycodone  -Continue decadron for post operative swelling  -Continue Protonix for GI prophylaxis while on steroids  -Leukocytosis likely secondary to steroids; will trend WBC & monitor for signs/symptoms of infection. Currently afebrile.  -Colace, senna for bowel regimen  -Encouraged mobilization  -Mild hypoxia likely secondary to atelectasis  -Encouraged incentive spirometer and instructed patient on proper use  -DVT prophylaxis: start SQL tonight; bilateral venodynes  -Dispo: PT eval pending  -Will discuss with Dr. Vika Meza # [id number]

## 2018-04-26 NOTE — DISCHARGE NOTE ADULT - PATIENT PORTAL LINK FT
You can access the [facility] Patient Portal, offered by [facility], by registering with the following website: [email or web address]

## 2018-04-26 NOTE — PROGRESS NOTE ADULT - PROBLEM SELECTOR PLAN 1
decadron taper  Protonix for GI ppx  Pt has no hx of diabetes. Will check blood glucose daily and HISS as needed.   Fall precautions  DVT ppx

## 2018-04-26 NOTE — PROGRESS NOTE ADULT - SUBJECTIVE AND OBJECTIVE BOX
SUBJECTIVE: Patient seen and examined at bedside. He is complaining of sore throat and cough overnight. Denies any incisional pain, chest pain, shortness of breath.    Vital Signs Last 24 Hrs  T(C): 37.5 (04-26-18 @ 08:09), Max: 37.5 (04-26-18 @ 08:09)  T(F): 99.5 (04-26-18 @ 08:09), Max: 99.5 (04-26-18 @ 08:09)  HR: 84 (04-26-18 @ 08:09) (71 - 101)  BP: 137/82 (04-26-18 @ 08:09) (124/80 - 146/86)  BP(mean): 102 (04-25-18 @ 23:45) (92 - 110)  RR: 20 (04-26-18 @ 08:09) (14 - 20)  SpO2: 96% (04-26-18 @ 08:09) (93% - 98%)    PHYSICAL EXAM:    Constitutional: No Acute Distress, resting comfortably in bed    Neurological: Awake, alert, oriented to person, place and time, speech clear and fluent, briskly following commands, moving all extremities with 5/5 strength, sensation intact to light touch throughout all extremities    Incision: +left anterior neck steri strips C/D/I    Drains: +HMV w/ 3 cc output / 24 hrs    Pulmonary: Clear to Auscultation, No rales, No rhonchi, No wheezes     Cardiovascular: S1, S2, Regular rate and rhythm     Gastrointestinal: Soft, Non-tender, Non-distended     Extremities: No calf tenderness bilaterally        LABS:                          15.5   17.2  )-----------( 311      ( 26 Apr 2018 09:20 )             47.1    04-26    138  |  99  |  20  ----------------------------<  132<H>  4.0   |  26  |  1.07    Ca    9.0      26 Apr 2018 09:20        04-25 @ 07:01  -  04-26 @ 07:00  --------------------------------------------------------  IN: 960 mL / OUT: 283 mL / NET: 677 mL    04-26 @ 07:01 - 04-26 @ 10:27  --------------------------------------------------------  IN: 360 mL / OUT: 300 mL / NET: 60 mL        IMAGING: no new post op imaging    MEDICATIONS:  Antibiotics:  ceFAZolin   IVPB 1000 milliGRAM(s) IV Intermittent every 8 hours    Neuro:  acetaminophen   Tablet 650 milliGRAM(s) Oral every 6 hours PRN For Temp greater than 38 C (100.4 F)  acetaminophen   Tablet 650 milliGRAM(s) Oral every 6 hours PRN For Temp greater than 38 C (100.4 F)  acetaminophen   Tablet. 650 milliGRAM(s) Oral every 6 hours PRN Mild Pain (1 - 3)  acetaminophen   Tablet. 650 milliGRAM(s) Oral every 6 hours PRN Mild Pain (1 - 3)  ondansetron Injectable 4 milliGRAM(s) IV Push every 6 hours PRN Nausea and/or Vomiting  oxyCODONE    IR 5 milliGRAM(s) Oral every 4 hours PRN Moderate Pain (4 - 6)  oxyCODONE    IR 10 milliGRAM(s) Oral every 4 hours PRN Severe Pain (7 - 10)    Cardiac:    Pulm:    GI/:  docusate sodium 100 milliGRAM(s) Oral three times a day  pantoprazole    Tablet 40 milliGRAM(s) Oral daily  senna 2 Tablet(s) Oral at bedtime PRN Constipation    Other:   dexamethasone  Injectable   IV Push   dexamethasone  Injectable 4 milliGRAM(s) IV Push every 6 hours  dextrose 5%. 1000 milliLiter(s) IV Continuous <Continuous>  dextrose 50% Injectable 12.5 Gram(s) IV Push once  dextrose 50% Injectable 25 Gram(s) IV Push once  dextrose 50% Injectable 25 Gram(s) IV Push once  dextrose Gel 1 Dose(s) Oral once PRN Blood Glucose LESS THAN 70 milliGRAM(s)/deciliter  glucagon  Injectable 1 milliGRAM(s) IntraMuscular once PRN Glucose LESS THAN 70 milligrams/deciliter  insulin lispro (HumaLOG) corrective regimen sliding scale   SubCutaneous three times a day before meals  sodium chloride 0.9% with potassium chloride 20 mEq/L 1000 milliLiter(s) IV Continuous <Continuous>        DIET: regular

## 2018-04-26 NOTE — DISCHARGE NOTE ADULT - NS AS ACTIVITY OBS
Walking-Indoors allowed/Walking-Outdoors allowed/Do not drive or operate machinery/No Heavy lifting/straining/Do not make important decisions/Stairs allowed

## 2018-04-26 NOTE — DISCHARGE NOTE ADULT - REASON FOR ADMISSION
Admitted 4/21 complaining of worsening ataxic gait x 2 days, neck/lower back stiffness x 2 weeks s/p heavy lifting, found with multi-level spondylosis; 4/25 s/p C4-6 anterior cervical discectomy with fusion

## 2018-04-26 NOTE — PROGRESS NOTE ADULT - SUBJECTIVE AND OBJECTIVE BOX
Neurology Follow up note    Subjective:Interval History - s/p C4-6 Anterior cervical discectomy with fusion. Tolerated well. Pt has no active complaints     Vital Signs Last 24 Hrs  T(C): 37.5 (26 Apr 2018 08:09), Max: 37.5 (26 Apr 2018 08:09)  T(F): 99.5 (26 Apr 2018 08:09), Max: 99.5 (26 Apr 2018 08:09)  HR: 88 (26 Apr 2018 10:44) (81 - 101)  BP: 142/85 (26 Apr 2018 10:44) (124/80 - 146/86)  BP(mean): 102 (25 Apr 2018 23:45) (92 - 110)  RR: 20 (26 Apr 2018 08:09) (14 - 20)  SpO2: 95% (26 Apr 2018 10:44) (93% - 98%)  General Exam:   General appearance: No acute distress                   Neurological Exam:  Mental Status: Orientated to self, date and place.  Attention intact.  No dysarthria, speech fluent. Follows simple and complex commands.    Cranial Nerves:  PERRL, EOMI, VFF, face symmetric    Motor: Normal tone. No drift. 4/5 RUE, 4/5 LUE triceps, delt 5/5 bilat, 5/5 bilat wrist ext/flexors, 5/5 finger flex/ext. 5/5 bilat HF 4+, o/w LE throughout.           Coord: No dysmetria on finger-nose-finger     Tremor: No resting, postural or action tremor.  No myoclonus.    Sensation: intact to light touch, pinprick, vibration and proprioception    Deep Tendon Reflexes: triple flexion RUE 3+, LUE 3+ (bi, tri, brach, pat, FF), 3+ patellar bilat, 4+ ankle bilat with 3-4 beats clonus    Other:  Radiology  MEDICATIONS  (STANDING):  ceFAZolin   IVPB 1000 milliGRAM(s) IV Intermittent every 8 hours  dexamethasone  Injectable   IV Push   dexamethasone  Injectable 4 milliGRAM(s) IV Push every 6 hours  dextrose 5%. 1000 milliLiter(s) (50 mL/Hr) IV Continuous <Continuous>  dextrose 50% Injectable 12.5 Gram(s) IV Push once  dextrose 50% Injectable 25 Gram(s) IV Push once  dextrose 50% Injectable 25 Gram(s) IV Push once  docusate sodium 100 milliGRAM(s) Oral three times a day  enoxaparin Injectable 40 milliGRAM(s) SubCutaneous at bedtime  insulin lispro (HumaLOG) corrective regimen sliding scale   SubCutaneous three times a day before meals  pantoprazole    Tablet 40 milliGRAM(s) Oral daily    MEDICATIONS  (PRN):  acetaminophen   Tablet 650 milliGRAM(s) Oral every 6 hours PRN For Temp greater than 38 C (100.4 F)  acetaminophen   Tablet 650 milliGRAM(s) Oral every 6 hours PRN For Temp greater than 38 C (100.4 F)  acetaminophen   Tablet. 650 milliGRAM(s) Oral every 6 hours PRN Mild Pain (1 - 3)  acetaminophen   Tablet. 650 milliGRAM(s) Oral every 6 hours PRN Mild Pain (1 - 3)  benzocaine 15 mG/menthol 3.6 mG Lozenge 1 Lozenge Oral every 4 hours PRN Sore Throat  dextrose Gel 1 Dose(s) Oral once PRN Blood Glucose LESS THAN 70 milliGRAM(s)/deciliter  glucagon  Injectable 1 milliGRAM(s) IntraMuscular once PRN Glucose LESS THAN 70 milligrams/deciliter  ondansetron Injectable 4 milliGRAM(s) IV Push every 6 hours PRN Nausea and/or Vomiting  oxyCODONE    IR 5 milliGRAM(s) Oral every 4 hours PRN Moderate Pain (4 - 6)  oxyCODONE    IR 10 milliGRAM(s) Oral every 4 hours PRN Severe Pain (7 - 10)  senna 2 Tablet(s) Oral at bedtime PRN Constipation    < from: MR Cervical Spine w/wo IV Cont (04.22.18 @ 11:23) >  FINDINGS: There is straightening of the normal cervical lordosis. There   is disc desiccation at C2-3 through C6-7.    C2-3: Minimal left uncovertebral joint hypertrophy narrows the left   neural foramen.    See 34: Circumferential disc bulge and posterior osteophyte formation.   Mild bilateral uncovertebral joint hypertrophy. Mild central canal and   mild left neural foraminal stenosis.    C45: Central disc herniation with extruded disc material extending   superiorly behind the C4 vertebral body. Extruded disc material results   in compression of the spinal cord. Increased signal intensity is present   within the spinal cord consistent with gliosis and/or edema from spinal   cord compression. The neural foramina are patent.    See 56: Anterior and posterior osteophytes, disc space narrowing and a   posterior osteophytic ridging. Uncovertebral joint hypertrophy   bilaterally. Moderate central canal, severe right and moderate left   neural foraminal stenosis with flattening of the spinal cord.    C6-7: Minimal disc bulge. Neurology Follow up note    Subjective:Interval History - s/p C4-6 Anterior cervical discectomy with fusion. Tolerated well. Pt has no active complaints other than soreness/pain in neck    Vital Signs Last 24 Hrs  T(C): 37.5 (26 Apr 2018 08:09), Max: 37.5 (26 Apr 2018 08:09)  T(F): 99.5 (26 Apr 2018 08:09), Max: 99.5 (26 Apr 2018 08:09)  HR: 88 (26 Apr 2018 10:44) (81 - 101)  BP: 142/85 (26 Apr 2018 10:44) (124/80 - 146/86)  BP(mean): 102 (25 Apr 2018 23:45) (92 - 110)  RR: 20 (26 Apr 2018 08:09) (14 - 20)  SpO2: 95% (26 Apr 2018 10:44) (93% - 98%)  General Exam:   General appearance: No acute distress                   Neurological Exam:  Mental Status: Orientated to self, date and place.  Attention intact.  No dysarthria, speech fluent. Follows simple and complex commands.    Cranial Nerves:  PERRL, EOMI, VFF, face symmetric    Motor: Normal tone. No drift. 4/5 RUE, 4/5 LUE triceps, delt 5/5 bilat, 5/5 bilat wrist ext/flexors, 5/5 finger flex/ext. 5/5 bilat HF 4+, o/w LE throughout.           Coord: No dysmetria on finger-nose-finger     Tremor: No resting, postural or action tremor.  No myoclonus.    Sensation: intact to light touch, pinprick, vibration and proprioception    Deep Tendon Reflexes: triple flexion RUE 3+, LUE 3+ (bi, tri, brach, pat, FF), 3+ patellar bilat, 4+ ankle bilat with 3-4 beats clonus    Other:  Radiology  MEDICATIONS  (STANDING):  ceFAZolin   IVPB 1000 milliGRAM(s) IV Intermittent every 8 hours  dexamethasone  Injectable   IV Push   dexamethasone  Injectable 4 milliGRAM(s) IV Push every 6 hours  docusate sodium 100 milliGRAM(s) Oral three times a day  enoxaparin Injectable 40 milliGRAM(s) SubCutaneous at bedtime  insulin lispro (HumaLOG) corrective regimen sliding scale   SubCutaneous three times a day before meals  pantoprazole    Tablet 40 milliGRAM(s) Oral daily    MEDICATIONS  (PRN):  acetaminophen   benzocaine 15 mG/menthol 3.6 mG Lozenge 1 Lozenge Oral every 4 hours PRN Sore Throat  dextrose Gel 1 Dose(s) Oral once PRN Blood Glucose LESS THAN 70 milliGRAM(s)/deciliter  glucagon  Injectable 1 milliGRAM(s) IntraMuscular once PRN Glucose LESS THAN 70 milligrams/deciliter  ondansetron Injectable 4 milliGRAM(s) IV Push every 6 hours PRN Nausea and/or Vomiting  oxyCODONE    IR 5 milliGRAM(s) Oral every 4 hours PRN Moderate Pain (4 - 6)  oxyCODONE    IR 10 milliGRAM(s) Oral every 4 hours PRN Severe Pain (7 - 10)  senna 2 Tablet(s) Oral at bedtime PRN Constipation    < from: MR Cervical Spine w/wo IV Cont (04.22.18 @ 11:23) >  FINDINGS: There is straightening of the normal cervical lordosis. There   is disc desiccation at C2-3 through C6-7.    C2-3: Minimal left uncovertebral joint hypertrophy narrows the left   neural foramen.    See 34: Circumferential disc bulge and posterior osteophyte formation.   Mild bilateral uncovertebral joint hypertrophy. Mild central canal and   mild left neural foraminal stenosis.    C45: Central disc herniation with extruded disc material extending   superiorly behind the C4 vertebral body. Extruded disc material results   in compression of the spinal cord. Increased signal intensity is present   within the spinal cord consistent with gliosis and/or edema from spinal   cord compression. The neural foramina are patent.    See 56: Anterior and posterior osteophytes, disc space narrowing and a   posterior osteophytic ridging. Uncovertebral joint hypertrophy   bilaterally. Moderate central canal, severe right and moderate left   neural foraminal stenosis with flattening of the spinal cord.    C6-7: Minimal disc bulge.

## 2018-04-26 NOTE — PROGRESS NOTE ADULT - PROVIDER SPECIALTY LIST ADULT
Internal Medicine
Neurology
Neurosurgery
Orthopedics
Internal Medicine

## 2018-04-26 NOTE — PROGRESS NOTE ADULT - SUBJECTIVE AND OBJECTIVE BOX
This is the first [facility] admission for this 47-year-old male who presents with a 2-week history of neck pain.  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  Of late, he notes that he had a rock fall on the back of his neck while at work.  He last worked 3 days ago, but has diminishing capacity.  The patient noted he  started developing weakness in arms and legs.  He was having difficulty walking.  He was having difficulty holding objects in his left hand.  He has also developed numbness and tingling in his legs and left arm.  He has constant discomfort in his left arm around the clock and rapidly progressive weakness in his left arm.  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  The patient has been advised surgical decompression, stabilization procedure.  In addition on MRI, he has disease extending into C3-C4 and also below at C5-C6 but not of the extent of the C4-C5 level. Symptoms have improved with high dose IV Decadron. seen today s/p ACDF last night      MEDICATIONS  (STANDING):  ceFAZolin   IVPB 1000 milliGRAM(s) IV Intermittent every 8 hours  dexamethasone  Injectable   IV Push   dexamethasone  Injectable 4 milliGRAM(s) IV Push every 6 hours  dextrose 5%. 1000 milliLiter(s) (50 mL/Hr) IV Continuous <Continuous>  dextrose 50% Injectable 12.5 Gram(s) IV Push once  dextrose 50% Injectable 25 Gram(s) IV Push once  dextrose 50% Injectable 25 Gram(s) IV Push once  docusate sodium 100 milliGRAM(s) Oral three times a day  enoxaparin Injectable 40 milliGRAM(s) SubCutaneous at bedtime  insulin lispro (HumaLOG) corrective regimen sliding scale   SubCutaneous three times a day before meals  pantoprazole    Tablet 40 milliGRAM(s) Oral daily    MEDICATIONS  (PRN):  acetaminophen   Tablet 650 milliGRAM(s) Oral every 6 hours PRN For Temp greater than 38 C (100.4 F)  acetaminophen   Tablet 650 milliGRAM(s) Oral every 6 hours PRN For Temp greater than 38 C (100.4 F)  acetaminophen   Tablet. 650 milliGRAM(s) Oral every 6 hours PRN Mild Pain (1 - 3)  acetaminophen   Tablet. 650 milliGRAM(s) Oral every 6 hours PRN Mild Pain (1 - 3)  benzocaine 15 mG/menthol 3.6 mG Lozenge 1 Lozenge Oral every 4 hours PRN Sore Throat  dextrose Gel 1 Dose(s) Oral once PRN Blood Glucose LESS THAN 70 milliGRAM(s)/deciliter  glucagon  Injectable 1 milliGRAM(s) IntraMuscular once PRN Glucose LESS THAN 70 milligrams/deciliter  ondansetron Injectable 4 milliGRAM(s) IV Push every 6 hours PRN Nausea and/or Vomiting  oxyCODONE    IR 5 milliGRAM(s) Oral every 4 hours PRN Moderate Pain (4 - 6)  oxyCODONE    IR 10 milliGRAM(s) Oral every 4 hours PRN Severe Pain (7 - 10)  senna 2 Tablet(s) Oral at bedtime PRN Constipation      PHYSICAL EXAMINATION:  At this time shows, VITAL SIGNS:  Blood pressure of 130/90, pulse is 100, respirations are 19.  He is afebrile.    Head:  Examination is normal.    Neck:  He has decreased range of motion with rigidity and tenderness in the mid cervical spine.    CHEST:  He has good breath sounds bilaterally.    CARDIAC:  Examination without gallops or murmurs.    ABDOMEN:  Soft without masses, tenderness, guarding or rebound.    EXTREMITIES:  Without clubbing, cyanosis or edema.    NEUROLOGICAL:  Examination shows 3/5 strength in the left arm, 4/5 strength in the right arm, 2/5 strength in the left leg and 4/5 strength in the right leg.  He has moderate to significant weakness as described.  Sensation is intact to touch and position.    VITALS:   T(C): 37.3 (04-26-18 @ 12:23), Max: 37.5 (04-26-18 @ 08:09)  HR: 86 (04-26-18 @ 12:23) (81 - 101)  BP: 138/82 (04-26-18 @ 12:23) (124/80 - 146/86)  RR: 20 (04-26-18 @ 12:23) (14 - 20)  SpO2: 97% (04-26-18 @ 12:23) (93% - 98%)  Wt(kg): --        LABS:        CBC Full  -  ( 26 Apr 2018 09:20 )  WBC Count : 17.2 K/uL  Hemoglobin : 15.5 g/dL  Hematocrit : 47.1 %  Platelet Count - Automated : 311 K/uL  Mean Cell Volume : 79.4 fl  Mean Cell Hemoglobin : 26.1 pg  Mean Cell Hemoglobin Concentration : 32.9 gm/dL  Auto Neutrophil # : x  Auto Lymphocyte # : x  Auto Monocyte # : x  Auto Eosinophil # : x  Auto Basophil # : x  Auto Neutrophil % : x  Auto Lymphocyte % : x  Auto Monocyte % : x  Auto Eosinophil % : x  Auto Basophil % : x    04-26    138  |  99  |  20  ----------------------------<  132<H>  4.0   |  26  |  1.07    Ca    9.0      26 Apr 2018 09:20            CAPILLARY BLOOD GLUCOSE      POCT Blood Glucose.: 132 mg/dL (26 Apr 2018 12:54)  POCT Blood Glucose.: 120 mg/dL (26 Apr 2018 08:45)  POCT Blood Glucose.: 135 mg/dL (25 Apr 2018 23:32)      RADIOLOGY & ADDITIONAL TESTS:

## 2018-04-26 NOTE — DISCHARGE NOTE ADULT - HOSPITAL COURSE
Patient is a 47 year old right-handed man who presented to [facility] on 4/21 complaining of 2 day onset of gait ataxia which was preceded by 2 week onset of neck and lower back stiffness x 2 weeks one day after carrying heavy objects up a stair case at work. Upon admission, he had a CT of the cervical spine which revealed cervical spondylosis at multiple levels. He then underwent MRI cervical, lumbar and thoracic spine. MRI revealed C4-C6 herniated discs resulting in spinal cord compression and spinal cord signal change. On 4/25/18, he underwent C4-6 anterior cervical discectomy with fusion. Post operatively, his Hemovac was removed without complication. He was placed on a decadron taper for post operative swelling. Tolerating regular diet. He was evaluated by physical therapy who recommended outpatient PT and occupational therapy who recommended no OT needs upon discharge. On the day of discharge, he is medically and neurosurgically stable and cleared for discharge home.

## 2018-04-26 NOTE — PROGRESS NOTE ADULT - PROBLEM SELECTOR PLAN 2
Pain meds as needed  follow for oversedation

## 2018-04-26 NOTE — DISCHARGE NOTE ADULT - CARE PROVIDER_API CALL
Augie Sandy (DO), Neurosurgery  Spine  [address]  [address]  [place] [address]  Phone: [phone]  Fax: [phone]    Brandon Alarcon), Geriatric Medicine; Internal Medicine  [address], [place] [id number]  Phone: [phone]  Fax: [phone]

## 2018-04-26 NOTE — DISCHARGE NOTE ADULT - PLAN OF CARE
s/p C4-6 anterior cervical discectomy with fusion .  .  .  Please make an appointment for follow up with neurosurgeon Dr. Sandy in 1-2 weeks. Call [phone] to schedule an appointment. Steri strips will fall off on their own. Ok to shower after 4/29/18. Keep incision site clean and dry. No creams, lotions, or ointments to incision area.   .  .  .  NO heavy lifting, strenuous activity, twisting, bending, driving, or working until cleared by your physician.   .  .  Return to ER immediately for any of the following: fever, bleeding, new onset numbness/tingling/weakness, nausea and/or vomiting, chest pain, shortness of breath, confusion, seizure, altered mental status, urinary and/or fecal incontinence or retention. .  .  Please make an appointment for follow up with your primary care physician after discharge. Please make sure to have CBC to follow up WBC count as outpatient.

## 2018-04-26 NOTE — PROGRESS NOTE ADULT - ASSESSMENT
46 yo right-handed man who presents to [facility] w/ 2 day onset of gait ataxia (no heel to toe when walking). This was preceded by 2 week onset of neck and lower back stiffness x 2 weeks one day after carrying heavy objects up a stair case. Patient is a  by profession and has no pertinent PMH/PSH surgery. ROS also revealed intermittent tingling pain into the left axilla when neck flexion. MR. Cervical spine revealed C4-C5 cord compression with edema. Exam notable for mild UE>LE symmetrical weakness and brisk reflexes throughout.    Mild improvement w decadron.  s/p C4-6 Anterior cervical discectomy with fusion.  Will sign off. Pt under Neurosurgery service s/p surgery 46 yo right-handed man who presents to [facility] w/ 2 day onset of gait ataxia (no heel to toe when walking). This was preceded by 2 week onset of neck and lower back stiffness x 2 weeks one day after carrying heavy objects up a stair case. Patient is a  by profession and has no pertinent PMH/PSH surgery. ROS also revealed intermittent tingling pain into the left axilla when neck flexion. MR. Cervical spine revealed C4-C5 cord compression with edema. Exam notable for mild UE>LE symmetrical weakness and brisk reflexes throughout.    Mild improvement w decadron.  s/p C4-6 Anterior cervical discectomy with fusion.  Will sign off. Pt under Neurosurgery service s/p surgery, will assess rehab needs prior to d/c  call for any further issues/questions

## 2018-04-26 NOTE — DISCHARGE NOTE ADULT - CARE PLAN
Principal Discharge DX:	Cervical cord compression with myelopathy  Goal:	s/p C4-6 anterior cervical discectomy with fusion  Assessment and plan of treatment:	.  .  .  Please make an appointment for follow up with neurosurgeon Dr. Sandy in 1-2 weeks. Call [phone] to schedule an appointment. Steri strips will fall off on their own. Ok to shower after 4/29/18. Keep incision site clean and dry. No creams, lotions, or ointments to incision area.   .  .  .  NO heavy lifting, strenuous activity, twisting, bending, driving, or working until cleared by your physician.   .  .  Return to ER immediately for any of the following: fever, bleeding, new onset numbness/tingling/weakness, nausea and/or vomiting, chest pain, shortness of breath, confusion, seizure, altered mental status, urinary and/or fecal incontinence or retention.  Secondary Diagnosis:	Leukocytosis  Assessment and plan of treatment:	.  .  Please make an appointment for follow up with your primary care physician after discharge. Please make sure to have CBC to follow up WBC count as outpatient.

## 2018-04-26 NOTE — DISCHARGE NOTE ADULT - MEDICATION SUMMARY - MEDICATIONS TO TAKE
I will START or STAY ON the medications listed below when I get home from the hospital:    Medrol Dosepak 4 mg oral tablet  -- Dispense : 1 medrol dose karthikeyan    Take as directed  -- It is very important that you take or use this exactly as directed.  Do not skip doses or discontinue unless directed by your doctor.  Obtain medical advice before taking any non-prescription drugs as some may affect the action of this medication.  Take with food or milk.    -- Indication: For spinal cord edma    acetaminophen 325 mg oral tablet  -- 2 tab(s) by mouth every 6 hours, As needed, Mild Pain (1 - 3)  -- Indication: For mild pain    oxyCODONE 5 mg oral tablet  -- 1 tab(s) by mouth every 4 hours, As needed, Moderate Pain (4 - 6) MDD:6 tabs  -- Indication: For moderate pain    benzocaine-menthol 15 mg-3.6 mg mucous membrane lozenge  -- 1 lozenge mucous membrane 4 times a day as needed for sore throat  -- Indication: For sore throat

## 2018-04-26 NOTE — PROGRESS NOTE ADULT - PROBLEM SELECTOR PROBLEM 1
Cervical cord compression with myelopathy

## 2018-04-26 NOTE — PROVIDER CONTACT NOTE (OTHER) - ASSESSMENT
Pt coughing up yellow sputum, denies SOB. Pt states that he feels congested. Slight rhonchi heard upon ascultation of R lung. Pt states he feels his throat is a little tight since surgery. VS: 99.1, 88 BPM, 142/83 96% on RA.

## 2018-04-26 NOTE — PROVIDER CONTACT NOTE (OTHER) - SITUATION
Pt coughing up yellow sputum, denies SOB. Pt states that he feels congested. Slight rhonchi heard upon ascultation of R lung. Pt states he feels his throat is a little tight since surgery.

## 2018-05-03 ENCOUNTER — APPOINTMENT (OUTPATIENT)
Dept: SPINE | Facility: CLINIC | Age: 48
End: 2018-05-03
Payer: MEDICAID

## 2018-05-03 VITALS
HEART RATE: 97 BPM | RESPIRATION RATE: 16 BRPM | OXYGEN SATURATION: 98 % | BODY MASS INDEX: 27.4 KG/M2 | SYSTOLIC BLOOD PRESSURE: 123 MMHG | DIASTOLIC BLOOD PRESSURE: 82 MMHG | TEMPERATURE: 99.5 F | WEIGHT: 185 LBS | HEIGHT: 69 IN

## 2018-05-03 DIAGNOSIS — M48.02 SPINAL STENOSIS, CERVICAL REGION: ICD-10-CM

## 2018-05-03 PROCEDURE — 99024 POSTOP FOLLOW-UP VISIT: CPT

## 2018-05-08 ENCOUNTER — OTHER (OUTPATIENT)
Age: 48
End: 2018-05-08

## 2018-05-23 PROCEDURE — 86901 BLOOD TYPING SEROLOGIC RH(D): CPT

## 2018-05-23 PROCEDURE — C1889: CPT

## 2018-05-23 PROCEDURE — 86850 RBC ANTIBODY SCREEN: CPT

## 2018-05-23 PROCEDURE — 84443 ASSAY THYROID STIM HORMONE: CPT

## 2018-05-23 PROCEDURE — 71046 X-RAY EXAM CHEST 2 VIEWS: CPT

## 2018-05-23 PROCEDURE — 93005 ELECTROCARDIOGRAM TRACING: CPT | Mod: 76

## 2018-05-23 PROCEDURE — 80061 LIPID PANEL: CPT

## 2018-05-23 PROCEDURE — 86900 BLOOD TYPING SEROLOGIC ABO: CPT

## 2018-05-23 PROCEDURE — 70450 CT HEAD/BRAIN W/O DYE: CPT

## 2018-05-23 PROCEDURE — 85652 RBC SED RATE AUTOMATED: CPT

## 2018-05-23 PROCEDURE — 82607 VITAMIN B-12: CPT

## 2018-05-23 PROCEDURE — 72157 MRI CHEST SPINE W/O & W/DYE: CPT

## 2018-05-23 PROCEDURE — 72156 MRI NECK SPINE W/O & W/DYE: CPT

## 2018-05-23 PROCEDURE — 82962 GLUCOSE BLOOD TEST: CPT

## 2018-05-23 PROCEDURE — 72158 MRI LUMBAR SPINE W/O & W/DYE: CPT

## 2018-05-23 PROCEDURE — 83036 HEMOGLOBIN GLYCOSYLATED A1C: CPT

## 2018-05-23 PROCEDURE — 86140 C-REACTIVE PROTEIN: CPT

## 2018-05-23 PROCEDURE — 97161 PT EVAL LOW COMPLEX 20 MIN: CPT

## 2018-05-23 PROCEDURE — 85027 COMPLETE CBC AUTOMATED: CPT

## 2018-05-23 PROCEDURE — 97165 OT EVAL LOW COMPLEX 30 MIN: CPT

## 2018-05-23 PROCEDURE — 86703 HIV-1/HIV-2 1 RESULT ANTBDY: CPT

## 2018-05-23 PROCEDURE — A9585: CPT

## 2018-05-23 PROCEDURE — 76000 FLUOROSCOPY <1 HR PHYS/QHP: CPT

## 2018-05-23 PROCEDURE — 85730 THROMBOPLASTIN TIME PARTIAL: CPT

## 2018-05-23 PROCEDURE — 70553 MRI BRAIN STEM W/O & W/DYE: CPT

## 2018-05-23 PROCEDURE — 85610 PROTHROMBIN TIME: CPT

## 2018-05-23 PROCEDURE — C1769: CPT

## 2018-05-23 PROCEDURE — 99285 EMERGENCY DEPT VISIT HI MDM: CPT | Mod: 25

## 2018-05-23 PROCEDURE — 72125 CT NECK SPINE W/O DYE: CPT

## 2018-05-23 PROCEDURE — 80048 BASIC METABOLIC PNL TOTAL CA: CPT

## 2018-05-23 PROCEDURE — 80053 COMPREHEN METABOLIC PANEL: CPT

## 2018-05-23 PROCEDURE — 72040 X-RAY EXAM NECK SPINE 2-3 VW: CPT

## 2018-05-23 PROCEDURE — C1713: CPT

## 2018-05-23 PROCEDURE — 81001 URINALYSIS AUTO W/SCOPE: CPT

## 2018-06-07 ENCOUNTER — APPOINTMENT (OUTPATIENT)
Dept: RADIOLOGY | Facility: CLINIC | Age: 48
End: 2018-06-07
Payer: MEDICAID

## 2018-06-07 ENCOUNTER — OUTPATIENT (OUTPATIENT)
Dept: OUTPATIENT SERVICES | Facility: HOSPITAL | Age: 48
LOS: 1 days | End: 2018-06-07
Payer: MEDICAID

## 2018-06-07 DIAGNOSIS — M48.02 SPINAL STENOSIS, CERVICAL REGION: ICD-10-CM

## 2018-06-07 PROCEDURE — 72040 X-RAY EXAM NECK SPINE 2-3 VW: CPT

## 2018-06-07 PROCEDURE — 72040 X-RAY EXAM NECK SPINE 2-3 VW: CPT | Mod: 26

## 2018-06-14 ENCOUNTER — APPOINTMENT (OUTPATIENT)
Dept: SPINE | Facility: CLINIC | Age: 48
End: 2018-06-14
Payer: MEDICAID

## 2018-06-14 VITALS
DIASTOLIC BLOOD PRESSURE: 90 MMHG | HEIGHT: 69 IN | HEART RATE: 87 BPM | WEIGHT: 198 LBS | SYSTOLIC BLOOD PRESSURE: 128 MMHG | BODY MASS INDEX: 29.33 KG/M2

## 2018-06-14 PROCEDURE — 99024 POSTOP FOLLOW-UP VISIT: CPT

## 2018-06-19 LAB
ALBUMIN SERPL ELPH-MCNC: 4.4 G/DL
ALP BLD-CCNC: 67 U/L
ALT SERPL-CCNC: 22 U/L
ANION GAP SERPL CALC-SCNC: 13 MMOL/L
AST SERPL-CCNC: 23 U/L
BASOPHILS # BLD AUTO: 0.04 K/UL
BASOPHILS NFR BLD AUTO: 0.5 %
BILIRUB SERPL-MCNC: 0.2 MG/DL
BUN SERPL-MCNC: 20 MG/DL
CALCIUM SERPL-MCNC: 9.2 MG/DL
CHLORIDE SERPL-SCNC: 101 MMOL/L
CHOLEST SERPL-MCNC: 230 MG/DL
CHOLEST/HDLC SERPL: 3.8 RATIO
CO2 SERPL-SCNC: 26 MMOL/L
CREAT SERPL-MCNC: 1.63 MG/DL
EOSINOPHIL # BLD AUTO: 0.13 K/UL
EOSINOPHIL NFR BLD AUTO: 1.6 %
GLUCOSE SERPL-MCNC: 98 MG/DL
HBA1C MFR BLD HPLC: 6 %
HCT VFR BLD CALC: 43.6 %
HDLC SERPL-MCNC: 60 MG/DL
HGB BLD-MCNC: 14 G/DL
IMM GRANULOCYTES NFR BLD AUTO: 0.1 %
LDLC SERPL CALC-MCNC: 146 MG/DL
LYMPHOCYTES # BLD AUTO: 2.44 K/UL
LYMPHOCYTES NFR BLD AUTO: 30.3 %
MAN DIFF?: NORMAL
MCHC RBC-ENTMCNC: 25 PG
MCHC RBC-ENTMCNC: 32.1 GM/DL
MCV RBC AUTO: 78 FL
MONOCYTES # BLD AUTO: 0.62 K/UL
MONOCYTES NFR BLD AUTO: 7.7 %
NEUTROPHILS # BLD AUTO: 4.8 K/UL
NEUTROPHILS NFR BLD AUTO: 59.8 %
PLATELET # BLD AUTO: 311 K/UL
POTASSIUM SERPL-SCNC: 4.5 MMOL/L
PROT SERPL-MCNC: 7.7 G/DL
RBC # BLD: 5.59 M/UL
RBC # FLD: 13.8 %
SODIUM SERPL-SCNC: 140 MMOL/L
TRIGL SERPL-MCNC: 118 MG/DL
WBC # FLD AUTO: 8.04 K/UL

## 2018-07-26 ENCOUNTER — APPOINTMENT (OUTPATIENT)
Dept: SPINE | Facility: CLINIC | Age: 48
End: 2018-07-26

## 2018-08-08 ENCOUNTER — OUTPATIENT (OUTPATIENT)
Dept: OUTPATIENT SERVICES | Facility: HOSPITAL | Age: 48
LOS: 1 days | End: 2018-08-08
Payer: COMMERCIAL

## 2018-08-08 ENCOUNTER — APPOINTMENT (OUTPATIENT)
Dept: RADIOLOGY | Facility: CLINIC | Age: 48
End: 2018-08-08
Payer: SELF-PAY

## 2018-08-08 DIAGNOSIS — M48.02 SPINAL STENOSIS, CERVICAL REGION: ICD-10-CM

## 2018-08-08 PROCEDURE — 72040 X-RAY EXAM NECK SPINE 2-3 VW: CPT | Mod: 26

## 2018-08-08 PROCEDURE — 72040 X-RAY EXAM NECK SPINE 2-3 VW: CPT

## 2018-08-09 ENCOUNTER — APPOINTMENT (OUTPATIENT)
Dept: SPINE | Facility: CLINIC | Age: 48
End: 2018-08-09
Payer: SELF-PAY

## 2018-08-09 VITALS
SYSTOLIC BLOOD PRESSURE: 155 MMHG | HEART RATE: 90 BPM | DIASTOLIC BLOOD PRESSURE: 99 MMHG | HEIGHT: 69 IN | BODY MASS INDEX: 29.33 KG/M2 | WEIGHT: 198 LBS

## 2018-08-09 PROCEDURE — 99214 OFFICE O/P EST MOD 30 MIN: CPT

## 2019-02-12 ENCOUNTER — APPOINTMENT (OUTPATIENT)
Dept: OPHTHALMOLOGY | Facility: CLINIC | Age: 49
End: 2019-02-12

## 2019-08-05 PROBLEM — R10.32 LEFT LOWER QUADRANT PAIN: Status: ACTIVE | Noted: 2017-08-10

## 2019-12-11 ENCOUNTER — NON-APPOINTMENT (OUTPATIENT)
Age: 49
End: 2019-12-11

## 2019-12-11 ENCOUNTER — APPOINTMENT (OUTPATIENT)
Dept: INTERNAL MEDICINE | Facility: CLINIC | Age: 49
End: 2019-12-11

## 2019-12-11 ENCOUNTER — OUTPATIENT (OUTPATIENT)
Dept: OUTPATIENT SERVICES | Facility: HOSPITAL | Age: 49
LOS: 1 days | End: 2019-12-11
Payer: SELF-PAY

## 2019-12-11 VITALS
WEIGHT: 204 LBS | DIASTOLIC BLOOD PRESSURE: 90 MMHG | SYSTOLIC BLOOD PRESSURE: 148 MMHG | HEIGHT: 69 IN | BODY MASS INDEX: 30.21 KG/M2

## 2019-12-11 DIAGNOSIS — N40.0 BENIGN PROSTATIC HYPERPLASIA WITHOUT LOWER URINARY TRACT SYMPMS: ICD-10-CM

## 2019-12-11 DIAGNOSIS — I10 ESSENTIAL (PRIMARY) HYPERTENSION: ICD-10-CM

## 2019-12-11 DIAGNOSIS — R39.11 HESITANCY OF MICTURITION: ICD-10-CM

## 2019-12-11 DIAGNOSIS — M54.2 CERVICALGIA: ICD-10-CM

## 2019-12-11 DIAGNOSIS — M54.5 LOW BACK PAIN: ICD-10-CM

## 2019-12-11 PROCEDURE — G0008: CPT

## 2019-12-11 PROCEDURE — 90688 IIV4 VACCINE SPLT 0.5 ML IM: CPT

## 2019-12-11 PROCEDURE — G0463: CPT | Mod: 25

## 2019-12-11 RX ORDER — LISINOPRIL 5 MG/1
5 TABLET ORAL DAILY
Qty: 30 | Refills: 0 | Status: DISCONTINUED | COMMUNITY
Start: 2019-12-11 | End: 2019-12-11

## 2019-12-11 RX ORDER — TAMSULOSIN HYDROCHLORIDE 0.4 MG/1
0.4 CAPSULE ORAL
Qty: 30 | Refills: 0 | Status: DISCONTINUED | COMMUNITY
Start: 2019-12-11 | End: 2019-12-11

## 2019-12-11 RX ORDER — LISINOPRIL 10 MG/1
10 TABLET ORAL DAILY
Qty: 90 | Refills: 2 | Status: ACTIVE | COMMUNITY
Start: 2019-12-11 | End: 1900-01-01

## 2019-12-11 NOTE — REVIEW OF SYSTEMS
[Back Pain] : back pain [Chills] : no chills [Recent Change In Weight] : ~T no recent weight change [Fever] : no fever [Palpitations] : no palpitations [Nasal Discharge] : no nasal discharge [Vision Problems] : no vision problems [Chest Pain] : no chest pain [Dyspnea on Exertion] : not dyspnea on exertion [Shortness Of Breath] : no shortness of breath [Lower Ext Edema] : no lower extremity edema [Nausea] : no nausea [Abdominal Pain] : no abdominal pain [Constipation] : no constipation [Diarrhea] : no diarrhea [Vomiting] : no vomiting [Heartburn] : no heartburn [Dysuria] : no dysuria [Muscle Pain] : no muscle pain [Hematuria] : no hematuria [Muscle Weakness] : no muscle weakness

## 2019-12-11 NOTE — PHYSICAL EXAM
[No Acute Distress] : no acute distress [Well Developed] : well developed [EOMI] : extraocular movements intact [Normal Sclera/Conjunctiva] : normal sclera/conjunctiva [Normal Outer Ear/Nose] : the outer ears and nose were normal in appearance [No Respiratory Distress] : no respiratory distress  [No Accessory Muscle Use] : no accessory muscle use [Clear to Auscultation] : lungs were clear to auscultation bilaterally [Normal Rate] : normal rate  [Normal S1, S2] : normal S1 and S2 [Regular Rhythm] : with a regular rhythm [No Edema] : there was no peripheral edema [No Murmur] : no murmur heard [Soft] : abdomen soft [Non Tender] : non-tender [Non-distended] : non-distended [No Masses] : no abdominal mass palpated [Grossly Normal Strength/Tone] : grossly normal strength/tone [Normal Bowel Sounds] : normal bowel sounds [No HSM] : no HSM [No Focal Deficits] : no focal deficits [Normal Insight/Judgement] : insight and judgment were intact [Normal Affect] : the affect was normal [de-identified] : R sided para spinal tenderness in lumbar region, straight leg raise negative bilaterally  [de-identified] : 5/5 upper and lower extremity strength, sensation is intat  [de-identified] : 5/5 upper and lower extremity strength

## 2019-12-11 NOTE — ASSESSMENT
[FreeTextEntry1] : 49 M w/ h/o external hemorrhoids, hyperlipidemia, s/p C4-C6 ACDF 4/25/18 who is presenting for an acute visit for evaluation of elevated blood pressure, neck pain, back pain and urinary symptoms\par \par # Incomplete voiding\par - wanted to start patient on flomax however attending calculated IPSS score and score did not correlate w/ BPH; attending suggested referral for urology.  Gave referral \par \par # HTN\par - Retook BP and found it to be 160/110\par - EKG performed, normal\par - got CMP for routine testing and baseline Cr and started pt on lisinopril 10\par - advised of potential side effects including agioedema and cough\par - RTC in 2 weeks for BP check and repeat BMP\par \par # Neck pain\par - no weakness or decreased sensation and pain has been stable since surgery and neurosurgery follow up\par - gave referral for PM&R to assess need for repeat imaging, possible steroid injection and PT recs\par \par # Back pain\par - no signs of spinal cord impingement; likely muscle spasm as straight leg raise negative bilaterally and pt does not endorse shooting pains\par - gave referral to PM&R\par \par # HCM\par - advised pt to make a CPE visit for his next visit\par - got A1C, lipid profile, CMP, CBC\par - gave flu shot\par \par # RTC in  2 weeks for BP check, possible CPE\par \par Flvaia Ruiz MD\par Internal Medicine, PGY-2

## 2019-12-11 NOTE — HISTORY OF PRESENT ILLNESS
[FreeTextEntry8] : 49 M w/ h/o external hemorrhoids, hyperlipidemia, s/p C4-C6 ACDF 4/25/18 who is presenting for an acute visit for a few complaints.\par \par First complaint is urinary frequency w/ the sensation of incomplete voiding.  Pt concerned that symptoms are related to his spinal surgery.  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  Denies weakness but endorses bilateral paresthesias w/ occasional muscle spasm.  \par \par Has trouble walking 2/2 back pain, no sciatica, no saddle anesthesia, no fecal or urinary incontinence.  No weakness, no paresthesias.  Reports that he feels stiff in the morning but that his gait improves throughout the day.  \par \par Pt also concerned about his blood pressure and has noticed that his readings have been elevated lately in the 150/90 ranage. \par

## 2019-12-11 NOTE — END OF VISIT
[FreeTextEntry3] : IPSS only 2   less than 1/2 the time incomplete emptying\par nocturia 0\par urgency  0\par weak stream 0\par < 2 hrs  0\par push   0\par stop  0\par incomplete emptying 2  (< half the time)   LOW SCORE SO NO ALPHA BLOCKER\par Urology r/o neurogenic bladder     Bladder obstruction sxs LOW [] : Resident

## 2019-12-13 LAB
ALBUMIN SERPL ELPH-MCNC: 5 G/DL
ALP BLD-CCNC: 75 U/L
ALT SERPL-CCNC: 38 U/L
ANION GAP SERPL CALC-SCNC: 12 MMOL/L
AST SERPL-CCNC: 23 U/L
BASOPHILS # BLD AUTO: 0.07 K/UL
BASOPHILS NFR BLD AUTO: 0.9 %
BILIRUB SERPL-MCNC: 0.3 MG/DL
BUN SERPL-MCNC: 16 MG/DL
CALCIUM SERPL-MCNC: 9.8 MG/DL
CHLORIDE SERPL-SCNC: 103 MMOL/L
CHOLEST SERPL-MCNC: 278 MG/DL
CHOLEST/HDLC SERPL: 4.6 RATIO
CO2 SERPL-SCNC: 26 MMOL/L
CREAT SERPL-MCNC: 1.12 MG/DL
EOSINOPHIL # BLD AUTO: 0.14 K/UL
EOSINOPHIL NFR BLD AUTO: 1.7 %
ESTIMATED AVERAGE GLUCOSE: 131 MG/DL
GLUCOSE SERPL-MCNC: 106 MG/DL
HBA1C MFR BLD HPLC: 6.2 %
HCT VFR BLD CALC: 46.7 %
HDLC SERPL-MCNC: 61 MG/DL
HGB BLD-MCNC: 14.8 G/DL
IMM GRANULOCYTES NFR BLD AUTO: 0.1 %
LDLC SERPL CALC-MCNC: 191 MG/DL
LYMPHOCYTES # BLD AUTO: 2.28 K/UL
LYMPHOCYTES NFR BLD AUTO: 28.4 %
MAN DIFF?: NORMAL
MCHC RBC-ENTMCNC: 25.3 PG
MCHC RBC-ENTMCNC: 31.7 GM/DL
MCV RBC AUTO: 79.7 FL
MONOCYTES # BLD AUTO: 0.71 K/UL
MONOCYTES NFR BLD AUTO: 8.9 %
NEUTROPHILS # BLD AUTO: 4.81 K/UL
NEUTROPHILS NFR BLD AUTO: 60 %
PLATELET # BLD AUTO: 302 K/UL
POTASSIUM SERPL-SCNC: 4.5 MMOL/L
PROT SERPL-MCNC: 7.5 G/DL
RBC # BLD: 5.86 M/UL
RBC # FLD: 13.9 %
SODIUM SERPL-SCNC: 141 MMOL/L
TRIGL SERPL-MCNC: 132 MG/DL
WBC # FLD AUTO: 8.02 K/UL

## 2019-12-16 DIAGNOSIS — Z23 ENCOUNTER FOR IMMUNIZATION: ICD-10-CM

## 2019-12-16 DIAGNOSIS — M54.5 LOW BACK PAIN: ICD-10-CM

## 2019-12-16 DIAGNOSIS — N40.0 BENIGN PROSTATIC HYPERPLASIA WITHOUT LOWER URINARY TRACT SYMPTOMS: ICD-10-CM

## 2019-12-16 DIAGNOSIS — M54.2 CERVICALGIA: ICD-10-CM

## 2019-12-16 DIAGNOSIS — R39.11 HESITANCY OF MICTURITION: ICD-10-CM

## 2019-12-27 ENCOUNTER — APPOINTMENT (OUTPATIENT)
Dept: INTERNAL MEDICINE | Facility: CLINIC | Age: 49
End: 2019-12-27

## 2021-10-06 PROBLEM — I10 ESSENTIAL HYPERTENSION: Status: ACTIVE | Noted: 2019-12-11

## 2022-01-10 NOTE — PHYSICAL THERAPY INITIAL EVALUATION ADULT - LEVEL OF INDEPENDENCE: STAIR NEGOTIATION, REHAB EVAL
I received a call from [facility] stating both she and the patient were exposed to Covid  [facility] states the patient is not having any symptoms at the current time but [facility] herself is  She is requesting Covid testing for the both of them  supervision

## 2025-06-27 NOTE — ED ADULT NURSE NOTE - [id number]_ED_A_ED
Last Appointment:  11/6/2024  Future Appointments   Date Time Provider Department Center   3/19/2025  3:30 PM Jorgito Schilling MD [facility]   4/30/2025  4:00 PM Gene Villa MD [facility] DEP       
addressed  
No